# Patient Record
Sex: MALE | Race: OTHER | HISPANIC OR LATINO
[De-identification: names, ages, dates, MRNs, and addresses within clinical notes are randomized per-mention and may not be internally consistent; named-entity substitution may affect disease eponyms.]

---

## 2020-04-09 ENCOUNTER — APPOINTMENT (OUTPATIENT)
Dept: DISASTER EMERGENCY | Facility: CLINIC | Age: 55
End: 2020-04-09
Payer: COMMERCIAL

## 2020-04-09 VITALS
RESPIRATION RATE: 14 BRPM | OXYGEN SATURATION: 99 % | TEMPERATURE: 98 F | HEART RATE: 87 BPM | SYSTOLIC BLOOD PRESSURE: 126 MMHG | DIASTOLIC BLOOD PRESSURE: 84 MMHG

## 2020-04-09 DIAGNOSIS — R68.89 OTHER GENERAL SYMPTOMS AND SIGNS: ICD-10-CM

## 2020-04-09 DIAGNOSIS — Z86.79 PERSONAL HISTORY OF OTHER DISEASES OF THE CIRCULATORY SYSTEM: ICD-10-CM

## 2020-04-09 DIAGNOSIS — R05 COUGH: ICD-10-CM

## 2020-04-09 DIAGNOSIS — R50.9 FEVER, UNSPECIFIED: ICD-10-CM

## 2020-04-09 DIAGNOSIS — Z86.69 PERSONAL HISTORY OF OTHER DISEASES OF THE NERVOUS SYSTEM AND SENSE ORGANS: ICD-10-CM

## 2020-04-09 PROBLEM — Z00.00 ENCOUNTER FOR PREVENTIVE HEALTH EXAMINATION: Status: ACTIVE | Noted: 2020-04-09

## 2020-04-09 PROCEDURE — 99213 OFFICE O/P EST LOW 20 MIN: CPT

## 2020-04-09 NOTE — ADDENDUM
[FreeTextEntry1] : -Likely Dx. of COVID-19, will test given patient is immunocompromised\par -Supportive care advised: Encouraged to increase fluids, rest, and take Tylenol PRN  as per 's recommendations\par -Discussed quarantine until 72 hours symptom free including fever free without use of Tylenol\par -Literature on COVID-19 and measures to prevent exposure to others provided and reviewed with pt\par -Pt to be notified by [facility] with results\par -\par \par

## 2020-04-09 NOTE — HISTORY OF PRESENT ILLNESS
[Patient presents to the office today for COVID-19 evaluation and testing.] : Patient presents to the office today for COVID-19 evaluation and testing. [Patient has been pre-screened by RN at call center for appointment today with our facility.] : Patient has been pre-screened by RN at call center for appointment today with our facility. [] : no dizziness on standing [With Confirmed Case] : patient exposed to a confirmed case of COVID-19 [Heart Disease] : heart disease [None] : none [COVID-19 testing ordered and specimen obtained] : COVID-19 testing ordered and specimen obtained [Discharged with current Quarantine instructions and advised of signs of worsening illness.] : Patient discharged with current quarantine instructions and advised of signs of worsening illness. Patient told to seek emergent care if symptoms occur. [FreeTextEntry1] : Mr. Moreno presents today with dry cough,. sore throat, subjective fever for the past x 4 weeks.\par Denies CP, SOB, AMBROSE, palpitations, lightheadedness, fever, chills, nausea, vomiting, diarrhea, or recent travel.\par \par  [TextBox_66] : MS [TextBox_107] : -Likely Dx. of COVID-19, will test given patient is immunocompromised.\par -Supportive care advised: Encouraged to increase fluids, rest, and take Tylenol PRN  as per 's recommendations\par -Discussed quarantine until 72 hours symptom free including fever free without use of Tylenol\par -Literature on COVID-19 and measures to prevent exposure to others provided and reviewed with pt\par -Pt to be notified by [facility] with results\par \par \par

## 2020-04-11 LAB — SARS-COV-2 N GENE NPH QL NAA+PROBE: NOT DETECTED

## 2020-05-12 PROBLEM — R68.89 SUSPECTED COVID-19 VIRUS INFECTION: Status: ACTIVE | Noted: 2020-04-09

## 2020-05-12 PROBLEM — R05 COUGH IN ADULT: Status: ACTIVE | Noted: 2020-05-12

## 2020-05-12 PROBLEM — R50.9 FEVER, UNSPECIFIED: Status: ACTIVE | Noted: 2020-05-12

## 2023-03-30 ENCOUNTER — TRANSCRIPTION ENCOUNTER (OUTPATIENT)
Age: 58
End: 2023-03-30

## 2023-03-30 VITALS
RESPIRATION RATE: 16 BRPM | DIASTOLIC BLOOD PRESSURE: 70 MMHG | TEMPERATURE: 97 F | WEIGHT: 175.71 LBS | HEIGHT: 70 IN | SYSTOLIC BLOOD PRESSURE: 112 MMHG | OXYGEN SATURATION: 96 % | HEART RATE: 88 BPM

## 2023-03-30 NOTE — ASU PATIENT PROFILE, ADULT - NSICDXPASTSURGICALHX_GEN_ALL_CORE_FT
PAST SURGICAL HISTORY:  H/O spinal fusion C5-6-7    History of shoulder surgery right shoulder X 2    S/P foot surgery L    Status post medial meniscus repair R

## 2023-03-30 NOTE — ASU PATIENT PROFILE, ADULT - FALL HARM RISK - UNIVERSAL INTERVENTIONS
Bed in lowest position, wheels locked, appropriate side rails in place/Call bell, personal items and telephone in reach/Instruct patient to call for assistance before getting out of bed or chair/Non-slip footwear when patient is out of bed/[place] to call system/Physically safe environment - no spills, clutter or unnecessary equipment/Purposeful Proactive Rounding/Room/bathroom lighting operational, light cord in reach

## 2023-03-31 ENCOUNTER — INPATIENT (INPATIENT)
Facility: HOSPITAL | Age: 58
LOS: 0 days | Discharge: HOME | DRG: 473 | End: 2023-04-01
Attending: NEUROLOGICAL SURGERY | Admitting: NEUROLOGICAL SURGERY
Payer: COMMERCIAL

## 2023-03-31 ENCOUNTER — TRANSCRIPTION ENCOUNTER (OUTPATIENT)
Age: 58
End: 2023-03-31

## 2023-03-31 DIAGNOSIS — Z98.1 ARTHRODESIS STATUS: Chronic | ICD-10-CM

## 2023-03-31 DIAGNOSIS — Z98.890 OTHER SPECIFIED POSTPROCEDURAL STATES: Chronic | ICD-10-CM

## 2023-03-31 DIAGNOSIS — M50.20 OTHER CERVICAL DISC DISPLACEMENT, UNSPECIFIED CERVICAL REGION: ICD-10-CM

## 2023-03-31 DIAGNOSIS — Z98.89 OTHER SPECIFIED POSTPROCEDURAL STATES: Chronic | ICD-10-CM

## 2023-03-31 LAB
BLD GP AB SCN SERPL QL: NEGATIVE — SIGNIFICANT CHANGE UP
RH IG SCN BLD-IMP: POSITIVE — SIGNIFICANT CHANGE UP

## 2023-03-31 DEVICE — SURGIFLO HEMOSTATIC MATRIX KIT: Type: IMPLANTABLE DEVICE | Status: FUNCTIONAL

## 2023-03-31 DEVICE — IMPLANTABLE DEVICE: Type: IMPLANTABLE DEVICE | Status: FUNCTIONAL

## 2023-03-31 DEVICE — SURGIFOAM PAD 8CM X 12.5CM X 10MM (100): Type: IMPLANTABLE DEVICE | Status: FUNCTIONAL

## 2023-03-31 RX ORDER — OXYCODONE HYDROCHLORIDE 5 MG/1
5 TABLET ORAL EVERY 4 HOURS
Refills: 0 | Status: DISCONTINUED | OUTPATIENT
Start: 2023-03-31 | End: 2023-04-01

## 2023-03-31 RX ORDER — APREPITANT 80 MG/1
40 CAPSULE ORAL ONCE
Refills: 0 | Status: COMPLETED | OUTPATIENT
Start: 2023-03-31 | End: 2023-03-31

## 2023-03-31 RX ORDER — POVIDONE-IODINE 5 %
1 AEROSOL (ML) TOPICAL ONCE
Refills: 0 | Status: COMPLETED | OUTPATIENT
Start: 2023-03-31 | End: 2023-03-31

## 2023-03-31 RX ORDER — SODIUM CHLORIDE 9 MG/ML
1000 INJECTION, SOLUTION INTRAVENOUS
Refills: 0 | Status: DISCONTINUED | OUTPATIENT
Start: 2023-03-31 | End: 2023-03-31

## 2023-03-31 RX ORDER — SENNA PLUS 8.6 MG/1
2 TABLET ORAL AT BEDTIME
Refills: 0 | Status: DISCONTINUED | OUTPATIENT
Start: 2023-03-31 | End: 2023-04-01

## 2023-03-31 RX ORDER — OXYCODONE HYDROCHLORIDE 5 MG/1
10 TABLET ORAL EVERY 6 HOURS
Refills: 0 | Status: DISCONTINUED | OUTPATIENT
Start: 2023-03-31 | End: 2023-04-01

## 2023-03-31 RX ORDER — ACETAMINOPHEN 500 MG
1000 TABLET ORAL ONCE
Refills: 0 | Status: COMPLETED | OUTPATIENT
Start: 2023-03-31 | End: 2023-03-31

## 2023-03-31 RX ORDER — CYCLOBENZAPRINE HYDROCHLORIDE 10 MG/1
10 TABLET, FILM COATED ORAL EVERY 8 HOURS
Refills: 0 | Status: DISCONTINUED | OUTPATIENT
Start: 2023-03-31 | End: 2023-04-01

## 2023-03-31 RX ORDER — DEXAMETHASONE 0.5 MG/5ML
4 ELIXIR ORAL EVERY 6 HOURS
Refills: 0 | Status: COMPLETED | OUTPATIENT
Start: 2023-03-31 | End: 2023-04-01

## 2023-03-31 RX ORDER — CELECOXIB 200 MG/1
200 CAPSULE ORAL ONCE
Refills: 0 | Status: COMPLETED | OUTPATIENT
Start: 2023-03-31 | End: 2023-03-31

## 2023-03-31 RX ORDER — HYDROMORPHONE HYDROCHLORIDE 2 MG/ML
0.5 INJECTION INTRAMUSCULAR; INTRAVENOUS; SUBCUTANEOUS
Refills: 0 | Status: DISCONTINUED | OUTPATIENT
Start: 2023-03-31 | End: 2023-03-31

## 2023-03-31 RX ORDER — ACETAMINOPHEN 500 MG
1000 TABLET ORAL EVERY 8 HOURS
Refills: 0 | Status: DISCONTINUED | OUTPATIENT
Start: 2023-03-31 | End: 2023-04-01

## 2023-03-31 RX ORDER — CHLORHEXIDINE GLUCONATE 213 G/1000ML
1 SOLUTION TOPICAL
Refills: 0 | Status: DISCONTINUED | OUTPATIENT
Start: 2023-03-31 | End: 2023-03-31

## 2023-03-31 RX ORDER — ACETAMINOPHEN 500 MG
1000 TABLET ORAL EVERY 8 HOURS
Refills: 0 | Status: DISCONTINUED | OUTPATIENT
Start: 2023-03-31 | End: 2023-03-31

## 2023-03-31 RX ORDER — ONDANSETRON 8 MG/1
4 TABLET, FILM COATED ORAL EVERY 6 HOURS
Refills: 0 | Status: DISCONTINUED | OUTPATIENT
Start: 2023-03-31 | End: 2023-04-01

## 2023-03-31 RX ADMIN — ONDANSETRON 4 MILLIGRAM(S): 8 TABLET, FILM COATED ORAL at 14:30

## 2023-03-31 RX ADMIN — CHLORHEXIDINE GLUCONATE 1 APPLICATION(S): 213 SOLUTION TOPICAL at 09:01

## 2023-03-31 RX ADMIN — Medication 1 APPLICATION(S): at 09:01

## 2023-03-31 RX ADMIN — HYDROMORPHONE HYDROCHLORIDE 0.5 MILLIGRAM(S): 2 INJECTION INTRAMUSCULAR; INTRAVENOUS; SUBCUTANEOUS at 15:35

## 2023-03-31 RX ADMIN — APREPITANT 40 MILLIGRAM(S): 80 CAPSULE ORAL at 08:54

## 2023-03-31 RX ADMIN — OXYCODONE HYDROCHLORIDE 5 MILLIGRAM(S): 5 TABLET ORAL at 16:19

## 2023-03-31 RX ADMIN — HYDROMORPHONE HYDROCHLORIDE 0.5 MILLIGRAM(S): 2 INJECTION INTRAMUSCULAR; INTRAVENOUS; SUBCUTANEOUS at 15:09

## 2023-03-31 RX ADMIN — OXYCODONE HYDROCHLORIDE 10 MILLIGRAM(S): 5 TABLET ORAL at 23:16

## 2023-03-31 RX ADMIN — CELECOXIB 200 MILLIGRAM(S): 200 CAPSULE ORAL at 08:54

## 2023-03-31 RX ADMIN — Medication 400 MILLIGRAM(S): at 14:49

## 2023-03-31 RX ADMIN — Medication 4 MILLIGRAM(S): at 17:34

## 2023-03-31 RX ADMIN — OXYCODONE HYDROCHLORIDE 5 MILLIGRAM(S): 5 TABLET ORAL at 16:49

## 2023-03-31 RX ADMIN — Medication 1000 MILLIGRAM(S): at 15:12

## 2023-03-31 RX ADMIN — Medication 1000 MILLIGRAM(S): at 08:53

## 2023-03-31 RX ADMIN — SODIUM CHLORIDE 75 MILLILITER(S): 9 INJECTION, SOLUTION INTRAVENOUS at 14:49
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
## 2023-03-31 NOTE — H&P ADULT - NSICDXFAMILYHX_GEN_ALL_CORE_FT
FAMILY HISTORY:  Father  Still living? Yes, Estimated age: Age Unknown  Family history of diabetes mellitus, Age at diagnosis: Age Unknown  Family history of hypertension, Age at diagnosis: Age Unknown    Mother  Still living? Yes, Estimated age: Age Unknown  Family history of diabetes mellitus, Age at diagnosis: Age Unknown

## 2023-03-31 NOTE — H&P ADULT - PROBLEM SELECTOR PLAN 1
OR today for ACDF C4-5, possible other levels  Risks, benefits, alternatives explained to the patient who understands and agrees to proceed with above  D/w Dr. Graves who agrees with above

## 2023-03-31 NOTE — PATIENT PROFILE ADULT - FALL HARM RISK - HARM RISK INTERVENTIONS
Assistance with ambulation/Assistance OOB with selected safe patient handling equipment/Communicate Risk of Fall with Harm to all staff/Monitor gait and stability/Reinforce activity limits and safety measures with patient and family/Sit up slowly, dangle for a short time, stand at bedside before walking/Tailored Fall Risk Interventions/Use of alarms - bed, chair and/or voice tab/Visual Cue: Yellow wristband and red socks/Bed in lowest position, wheels locked, appropriate side rails in place/Call bell, personal items and telephone in reach/Instruct patient to call for assistance before getting out of bed or chair/Non-slip footwear when patient is out of bed/[place] to call system/Physically safe environment - no spills, clutter or unnecessary equipment/Purposeful Proactive Rounding/Room/bathroom lighting operational, light cord in reach Assistance with ambulation/Assistance OOB with selected safe patient handling equipment/Communicate Risk of Fall with Harm to all staff/Monitor gait and stability/Orthostatic vital signs/Reinforce activity limits and safety measures with patient and family/Sit up slowly, dangle for a short time, stand at bedside before walking/Tailored Fall Risk Interventions/Use of alarms - bed, chair and/or voice tab/Visual Cue: Yellow wristband and red socks/Bed in lowest position, wheels locked, appropriate side rails in place/Call bell, personal items and telephone in reach/Instruct patient to call for assistance before getting out of bed or chair/Non-slip footwear when patient is out of bed/[place] to call system/Physically safe environment - no spills, clutter or unnecessary equipment/Purposeful Proactive Rounding/Room/bathroom lighting operational, light cord in reach

## 2023-03-31 NOTE — PATIENT PROFILE ADULT - NSPROGENOTHERPROVIDER_GEN_A_NUR
none Implemented All Fall with Harm Risk Interventions:  [place] to call system. Call bell, personal items and telephone within reach. Instruct patient to call for assistance. Room bathroom lighting operational. Non-slip footwear when patient is off stretcher. Physically safe environment: no spills, clutter or unnecessary equipment. Stretcher in lowest position, wheels locked, appropriate side rails in place. Provide visual cue, wrist band, yellow gown, etc. Monitor gait and stability. Monitor for mental status changes and reorient to person, place, and time. Review medications for side effects contributing to fall risk. Reinforce activity limits and safety measures with patient and family. Provide visual clues: red socks.

## 2023-03-31 NOTE — H&P ADULT - NSHPPHYSICALEXAM_GEN_ALL_CORE
NAD, AAOx 3  MMM, no lymphadenopathy  +S1, S2  normal breathing pattern  abd soft NT  no edema  calves soft NT b/l    All CN intact  anterior neck scar well healed  normal sensation MN/UN/RN b/l  normal strength BUE  right paraspinal spasm and tenderness, no midline tenderness

## 2023-03-31 NOTE — H&P ADULT - NSHPLABSRESULTS_GEN_ALL_CORE
Please see completed report in cardiology procedures.  
MRI of cervical spine from outside facility shows C4-5 disc herniation

## 2023-04-01 ENCOUNTER — TRANSCRIPTION ENCOUNTER (OUTPATIENT)
Age: 58
End: 2023-04-01

## 2023-04-01 VITALS
SYSTOLIC BLOOD PRESSURE: 126 MMHG | RESPIRATION RATE: 16 BRPM | HEART RATE: 76 BPM | DIASTOLIC BLOOD PRESSURE: 62 MMHG | OXYGEN SATURATION: 96 %

## 2023-04-01 DIAGNOSIS — D72.829 ELEVATED WHITE BLOOD CELL COUNT, UNSPECIFIED: ICD-10-CM

## 2023-04-01 DIAGNOSIS — Z98.890 OTHER SPECIFIED POSTPROCEDURAL STATES: ICD-10-CM

## 2023-04-01 LAB
ANION GAP SERPL CALC-SCNC: 10 MMOL/L — SIGNIFICANT CHANGE UP (ref 5–17)
BASOPHILS # BLD AUTO: 0.03 K/UL — SIGNIFICANT CHANGE UP (ref 0–0.2)
BASOPHILS NFR BLD AUTO: 0.2 % — SIGNIFICANT CHANGE UP (ref 0–2)
BUN SERPL-MCNC: 18 MG/DL — SIGNIFICANT CHANGE UP (ref 7–23)
CALCIUM SERPL-MCNC: 8.7 MG/DL — SIGNIFICANT CHANGE UP (ref 8.4–10.5)
CHLORIDE SERPL-SCNC: 101 MMOL/L — SIGNIFICANT CHANGE UP (ref 96–108)
CO2 SERPL-SCNC: 24 MMOL/L — SIGNIFICANT CHANGE UP (ref 22–31)
CREAT SERPL-MCNC: 0.74 MG/DL — SIGNIFICANT CHANGE UP (ref 0.5–1.3)
EGFR: 106 ML/MIN/1.73M2 — SIGNIFICANT CHANGE UP
EOSINOPHIL # BLD AUTO: 0 K/UL — SIGNIFICANT CHANGE UP (ref 0–0.5)
EOSINOPHIL NFR BLD AUTO: 0 % — SIGNIFICANT CHANGE UP (ref 0–6)
GLUCOSE BLDC GLUCOMTR-MCNC: 139 MG/DL — HIGH (ref 70–99)
GLUCOSE BLDC GLUCOMTR-MCNC: 154 MG/DL — HIGH (ref 70–99)
GLUCOSE SERPL-MCNC: 145 MG/DL — HIGH (ref 70–99)
HCT VFR BLD CALC: 45.4 % — SIGNIFICANT CHANGE UP (ref 39–50)
HGB BLD-MCNC: 14.7 G/DL — SIGNIFICANT CHANGE UP (ref 13–17)
IMM GRANULOCYTES NFR BLD AUTO: 0.7 % — SIGNIFICANT CHANGE UP (ref 0–0.9)
LYMPHOCYTES # BLD AUTO: 10.9 % — LOW (ref 13–44)
LYMPHOCYTES # BLD AUTO: 2.04 K/UL — SIGNIFICANT CHANGE UP (ref 1–3.3)
MAGNESIUM SERPL-MCNC: 2.2 MG/DL — SIGNIFICANT CHANGE UP (ref 1.6–2.6)
MCHC RBC-ENTMCNC: 30.7 PG — SIGNIFICANT CHANGE UP (ref 27–34)
MCHC RBC-ENTMCNC: 32.4 GM/DL — SIGNIFICANT CHANGE UP (ref 32–36)
MCV RBC AUTO: 94.8 FL — SIGNIFICANT CHANGE UP (ref 80–100)
MONOCYTES # BLD AUTO: 0.66 K/UL — SIGNIFICANT CHANGE UP (ref 0–0.9)
MONOCYTES NFR BLD AUTO: 3.5 % — SIGNIFICANT CHANGE UP (ref 2–14)
NEUTROPHILS # BLD AUTO: 15.84 K/UL — HIGH (ref 1.8–7.4)
NEUTROPHILS NFR BLD AUTO: 84.7 % — HIGH (ref 43–77)
NRBC # BLD: 0 /100 WBCS — SIGNIFICANT CHANGE UP (ref 0–0)
PHOSPHATE SERPL-MCNC: 2.2 MG/DL — LOW (ref 2.5–4.5)
PLATELET # BLD AUTO: 268 K/UL — SIGNIFICANT CHANGE UP (ref 150–400)
POTASSIUM SERPL-MCNC: 3.9 MMOL/L — SIGNIFICANT CHANGE UP (ref 3.5–5.3)
POTASSIUM SERPL-SCNC: 3.9 MMOL/L — SIGNIFICANT CHANGE UP (ref 3.5–5.3)
RBC # BLD: 4.79 M/UL — SIGNIFICANT CHANGE UP (ref 4.2–5.8)
RBC # FLD: 13.2 % — SIGNIFICANT CHANGE UP (ref 10.3–14.5)
SODIUM SERPL-SCNC: 135 MMOL/L — SIGNIFICANT CHANGE UP (ref 135–145)
WBC # BLD: 18.71 K/UL — HIGH (ref 3.8–10.5)
WBC # FLD AUTO: 18.71 K/UL — HIGH (ref 3.8–10.5)

## 2023-04-01 PROCEDURE — 99024 POSTOP FOLLOW-UP VISIT: CPT

## 2023-04-01 PROCEDURE — 99232 SBSQ HOSP IP/OBS MODERATE 35: CPT

## 2023-04-01 RX ORDER — ENOXAPARIN SODIUM 100 MG/ML
40 INJECTION SUBCUTANEOUS EVERY 24 HOURS
Refills: 0 | Status: DISCONTINUED | OUTPATIENT
Start: 2023-04-01 | End: 2023-04-01

## 2023-04-01 RX ORDER — OXYCODONE HYDROCHLORIDE 5 MG/1
1 TABLET ORAL
Qty: 18 | Refills: 0
Start: 2023-04-01 | End: 2023-04-03

## 2023-04-01 RX ORDER — SENNA PLUS 8.6 MG/1
2 TABLET ORAL
Qty: 7 | Refills: 0
Start: 2023-04-01

## 2023-04-01 RX ORDER — PANTOPRAZOLE SODIUM 20 MG/1
1 TABLET, DELAYED RELEASE ORAL
Qty: 7 | Refills: 0
Start: 2023-04-01 | End: 2023-04-07

## 2023-04-01 RX ORDER — CYCLOBENZAPRINE HYDROCHLORIDE 10 MG/1
1 TABLET, FILM COATED ORAL
Qty: 15 | Refills: 0
Start: 2023-04-01 | End: 2023-04-05

## 2023-04-01 RX ADMIN — Medication 50 MILLIGRAM(S): at 06:26

## 2023-04-01 RX ADMIN — Medication 4 MILLIGRAM(S): at 13:15

## 2023-04-01 RX ADMIN — Medication 1 TABLET(S): at 13:15

## 2023-04-01 RX ADMIN — OXYCODONE HYDROCHLORIDE 10 MILLIGRAM(S): 5 TABLET ORAL at 00:21

## 2023-04-01 RX ADMIN — SENNA PLUS 2 TABLET(S): 8.6 TABLET ORAL at 00:14

## 2023-04-01 RX ADMIN — Medication 50 MILLIGRAM(S): at 13:15

## 2023-04-01 RX ADMIN — Medication 4 MILLIGRAM(S): at 00:31

## 2023-04-01 RX ADMIN — Medication 1000 MILLIGRAM(S): at 00:21

## 2023-04-01 RX ADMIN — ONDANSETRON 4 MILLIGRAM(S): 8 TABLET, FILM COATED ORAL at 00:17

## 2023-04-01 RX ADMIN — Medication 4 MILLIGRAM(S): at 06:25

## 2023-04-01 RX ADMIN — OXYCODONE HYDROCHLORIDE 10 MILLIGRAM(S): 5 TABLET ORAL at 07:31

## 2023-04-01 RX ADMIN — Medication 1000 MILLIGRAM(S): at 07:31

## 2023-04-01 RX ADMIN — Medication 1000 MILLIGRAM(S): at 06:26

## 2023-04-01 RX ADMIN — Medication 1000 MILLIGRAM(S): at 00:19

## 2023-04-01 RX ADMIN — OXYCODONE HYDROCHLORIDE 10 MILLIGRAM(S): 5 TABLET ORAL at 06:26

## 2023-04-01 NOTE — DISCHARGE NOTE PROVIDER - HOSPITAL COURSE
HPI: Patient is a 59 y/o male who presents for elective anterior discectomy and fusion at C4-5. Now S/P C4-5 ACDF (3/31).     Hospital Course:  3/31: POD0 C4-5 ACDF. Dressing replaced.  4/1: POD1. PILAR overnight. Neuro stable.     Patient evaluated by PT/OT who recommended:  Patient is going home     Hospital course c/b: uncomplicated     Exam on day of discharge:  General: patient seen laying supine in bed in NAD  Neuro: AAOx3, FC, OE spontaneously, speech clear and fluent, CNII-XI grossly intact, face symmetric, no pronator drift, finger to nose intact, strength 5/5 b/l UE and LE, sensation intact to light touch throughout  HEENT: PERRL, EOMI  Neck: supple  Cardiac: RRR, S1S2  Pulmonary: chest rise symmetric  Abdomen: soft, nontender, nondistended  Ext: perfusing well  Skin: warm, dry  Wound: anterior cervical incision, clean/dry/intact    The patient is neurologically stable. Labs/vitals stable. Pain adequately controlled.
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
## 2023-04-01 NOTE — DISCHARGE NOTE NURSING/CASE MANAGEMENT/SOCIAL WORK - NSDCPEFALRISK_GEN_ALL_CORE
For information on Fall & Injury Prevention, visit: [email or web address] OR  [email or web address] OR  https://www.cdc.gov/steadi/patient.html

## 2023-04-01 NOTE — PROCEDURE NOTE - ADDITIONAL PROCEDURE DETAILS
JESSICA bulb was deflated. Area was cleansed with chlorhexidine.  Suture was cut. catheter removed. Puncture hole was closed with steri strip.

## 2023-04-01 NOTE — OCCUPATIONAL THERAPY INITIAL EVALUATION ADULT - DIAGNOSIS, OT EVAL
Patient POD #1 s/p C4-C5 ACDF, with cervical/spinal precautions, does not present with physical or neurological deficits impacting independence with functional activities and mobility. Patient demonstrates safe and independent performance of ADL's and functional mobility with no AD and is d/c from skilled OT at this time.

## 2023-04-01 NOTE — DISCHARGE NOTE PROVIDER - NSDCMRMEDTOKEN_GEN_ALL_CORE_FT
Stiolto Respimat 10 ACT 2.5 mcg-2.5 mcg/inh inhalation aerosol: 2 inhaled once a day   cyclobenzaprine 10 mg oral tablet: 1 tab(s) orally every 8 hours as needed for Muscle Spasm MDD: 3  Innerclean oral tablet: 2 tab(s) orally once a day (at bedtime) as needed for  constipation  Lyrica 50 mg oral capsule: 1 cap(s) orally 3 times a day MDD: 3  Medrol Dosepak 4 mg oral tablet: 1 packet(s) orally once a day Follow steroid taper on the pack  Protonix 40 mg oral delayed release tablet: 1 tab(s) orally once a day To be taken while on the medrol dose pack  Roxicodone 5 mg oral tablet: 1 tab(s) orally every 4 hours as needed for  severe pain MDD: 6  Stiolto Respimat 10 ACT 2.5 mcg-2.5 mcg/inh inhalation aerosol: 2 inhaled once a day

## 2023-04-01 NOTE — DISCHARGE NOTE PROVIDER - NSDCFUADDINST_GEN_ALL_CORE_FT
Neurosurgery follow up appointment date/time:  - you have internal stiches in place that do not need to be removed  - please call the office to confirm appointment: [phone]     Wound Care:  - you can shower post operative day 5 (4/5/23)  - keep incision open to air     Devices:  - you were provided a soft collar for comfort, you can wear the collar as you feel needed    Activity:  - fatigue is common after surgery, rest if you feel tired   - no bending, lifting, twisting or heavy lifting   - walking is recommended, ambulate as tolerated  - keep incision clean/dry  - no baths/swimming pools/hot tubs    - no driving within 24 hours of anesthesia or while taking prescription pain medications   - keep hydrated, drink plenty of water     Please also follow up with your primary care doctor.     Pain Expectations:  - pain after surgery is expected  - please take pain medications as prescribed     Medications:  - you may continue taking your home medications as prescribed   - you were prescribed a medrol dose pack (steroid taper), protonix to protect the stomach while taking the steroid taper, flexeril as needed for muscle spasms, oxycodone as needed for severe pain, lyrica for nerve related symptoms, bowel regimen as needed for constipation  - pain medications can cause constipation, you should eat a high fiber diet and may take a stool softener while on pain meds   - Avoid taking Advil (ibuprofen), Motrin (naproxen), or Aspirin for pain as they can cause bleeding     Call the office or come to ED if:  - wound has drainage or bleeding, increased redness or pain at incision site, neurological change, fever (>101), chills, night sweats, syncope, nausea/vomiting      Playback:  - discharge instructions uploaded to [employer]    WITHIN 24 HOURS OF DISCHARGE, PLEASE CONTACT NEURO PA  WITH ANY QUESTIONS OR CONCERNS: [phone]   OTHERWISE, PLEASE CALL THE OFFICE WITH ANY QUESTIONS OR CONCERNS: [phone]

## 2023-04-01 NOTE — DISCHARGE NOTE PROVIDER - NSDCFUADDAPPT_GEN_ALL_CORE_FT
Please follow-up with Dr. Graves, call his office to confirm appointment pro/time    Follow up with your primary care doctor

## 2023-04-01 NOTE — OCCUPATIONAL THERAPY INITIAL EVALUATION ADULT - GENERAL OBSERVATIONS, REHAB EVAL
Patient fiance present. Patient received seated in bed +tele, +anterior cervical bandage C/D/I, room air, NAD.
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
## 2023-04-01 NOTE — PROGRESS NOTE ADULT - ASSESSMENT
57 y/o male who presents for elective anterior discectomy and fusion at C4-5. Now S/P C4-5 ACDF (3/31).

## 2023-04-01 NOTE — OCCUPATIONAL THERAPY INITIAL EVALUATION ADULT - ADDITIONAL COMMENTS
Patient reports living with his fiance in an elevator access apartment building however will be staying with his sister in a private home with 3 GEORGE and 1 FOS to the 2nd floor where the bedroom is. Patient was independent with all ADL's, IADL's and functional mobility with no AD prior to admission. Patient is R hand dominant and will have a walk in shower at [facility] .

## 2023-04-01 NOTE — PROGRESS NOTE ADULT - REASON FOR ADMISSION
right sided neck pain radiating to right arm

## 2023-04-01 NOTE — OCCUPATIONAL THERAPY INITIAL EVALUATION ADULT - MODIFIED CLINICAL TEST OF SENSORY INTEGRATION IN BALANCE TEST
Patient functionally ambulated in the hallway and performed 1 FOS up and down independently with no AD.

## 2023-04-01 NOTE — DISCHARGE NOTE PROVIDER - NSDCCPTREATMENT_GEN_ALL_CORE_FT
PRINCIPAL PROCEDURE  Procedure: Anterior cervical discectomy with fusion  Findings and Treatment: C4-5

## 2023-04-01 NOTE — DISCHARGE NOTE NURSING/CASE MANAGEMENT/SOCIAL WORK - PATIENT PORTAL LINK FT
You can access the FollowMyHealth Patient Portal offered by [facility] by registering at the following website: [email or web address]. By joining [facility]’s FollowMyHealth portal, you will also be able to view your health information using other applications (apps) compatible with our system.

## 2023-04-01 NOTE — PROCEDURE NOTE - ESTIMATED BLOOD LOSS
Shift assessment completed. Routine vitals stable. Scheduled medications given. Patient is awake and alert  Respirations are easy and unlabored. No c/o pain. Pt incont x2 this shift. Pt requested purewick cath and depends. Purewick in place. Call light within reach. Will cont to monitor. None

## 2023-04-01 NOTE — OCCUPATIONAL THERAPY INITIAL EVALUATION ADULT - REHAB POTENTIAL, OT EVAL
Patient demonstrates safe and independent performance of ADL's and functional mobility with no AD and is d/c from skilled OT at this time.

## 2023-04-01 NOTE — DISCHARGE NOTE PROVIDER - CARE PROVIDER_API CALL
Keenan Graves)  Neurosurgery  [address], [address]  [place] [address]  Phone: [phone]  Fax: [phone]  Follow Up Time:

## 2023-04-08 DIAGNOSIS — M50.121 CERVICAL DISC DISORDER AT C4-C5 LEVEL WITH RADICULOPATHY: ICD-10-CM

## 2023-04-08 DIAGNOSIS — M50.221 OTHER CERVICAL DISC DISPLACEMENT AT C4-C5 LEVEL: ICD-10-CM

## 2023-04-08 DIAGNOSIS — D72.829 ELEVATED WHITE BLOOD CELL COUNT, UNSPECIFIED: ICD-10-CM

## 2023-04-12 NOTE — PRE-ANESTHESIA EVALUATION ADULT - NSANTHOBSERVEDRD_ENT_A_CORE
On assessment, patient complaining of abdominal pain. Patient NGT not suctioning and canister empty from when I assumed care. Writing RN ensured the NGT is working and functioning properly. NG to LIS, immediately suction 100 cc brown drainage. Continue NGT to LIS and then clamped NGT at [address]: Patient denies pain, reports some nausea but tolerated. Patient reports difficulty breathing because of abdominal distention. Patient reposition in bed, raise HOB, on 2l o2 for comfort, encourage IS use.       Problem: Diabetes/Glucose Control  Goal: Glucose maintained within prescribed range  Description: INTERVENTIONS:  - Monitor Blood Glucose as ordered  - Assess for signs and symptoms of hyperglycemia and hypoglycemia  - Administer ordered medications to maintain glucose within target range  - Assess barriers to adequate nutritional intake and initiate nutrition consult as needed  - Instruct patient on self management of diabetes  Outcome: Progressing     Problem: PAIN - ADULT  Goal: Verbalizes/displays adequate comfort level or patient's stated pain goal  Description: INTERVENTIONS:  - Encourage pt to monitor pain and request assistance  - Assess pain using appropriate pain scale  - Administer analgesics based on type and severity of pain and evaluate response  - Implement non-pharmacological measures as appropriate and evaluate response  - Consider cultural and social influences on pain and pain management  - Manage/alleviate anxiety  - Utilize distraction and/or relaxation techniques  - Monitor for opioid side effects  - Notify MD/LIP if interventions unsuccessful or patient reports new pain  - Anticipate increased pain with activity and pre-medicate as appropriate  Outcome: Progressing     Problem: GASTROINTESTINAL - ADULT  Goal: Minimal or absence of nausea and vomiting  Description: INTERVENTIONS:  - Maintain adequate hydration with IV or PO as ordered and tolerated  - Nasogastric tube to low intermittent suction as ordered  - Evaluate effectiveness of ordered antiemetic medications  - Provide nonpharmacologic comfort measures as appropriate  - Advance diet as tolerated, if ordered  - Obtain nutritional consult as needed  - Evaluate fluid balance  Outcome: Progressing  Goal: Maintains or returns to baseline bowel function  Description: INTERVENTIONS:  - Assess bowel function  - Maintain adequate hydration with IV or PO as ordered and tolerated  - Evaluate effectiveness of GI medications  - Encourage mobilization and activity  - Obtain nutritional consult as needed  - Establish a toileting routine/schedule  - Consider collaborating with pharmacy to review patient's medication profile  Outcome: Progressing     Problem: HEMATOLOGIC - ADULT  Goal: Free from bleeding injury  Description: (Example usage: patient with low platelets)  INTERVENTIONS:  - Avoid intramuscular injections, enemas and rectal medication administration  - Ensure safe mobilization of patient  - Hold pressure on venipuncture sites to achieve adequate hemostasis  - Assess for signs and symptoms of internal bleeding  - Monitor lab trends  - Patient is to report abnormal signs of bleeding to staff  - Avoid use of toothpicks and dental floss  - Use electric shaver for shaving  - Use soft bristle tooth brush  - Limit straining and forceful nose blowing  Outcome: Progressing     Problem: Patient/Family Goals  Goal: Patient/Family Long Term Goal  Description: Patient's Long Term Goal: Discharge    Interventions:  - TPN  -Ambulate 4X/day  - See additional Care Plan goals for specific interventions  Outcome: Progressing  Goal: Patient/Family Short Term Goal  Description: Patient's Short Term Goal: Comfort    Interventions:   - Pain meds as needed  - See additional Care Plan goals for specific interventions  Outcome: Progressing     Problem: SAFETY ADULT - FALL  Goal: Free from fall injury  Description: INTERVENTIONS:  - Assess pt frequently for physical needs  - Identify cognitive and physical deficits and behaviors that affect risk of falls.   - [place] fall precautions as indicated by assessment.  - Educate pt/family on patient safety including physical limitations  - Instruct pt to call for assistance with activity based on assessment  - Modify environment to reduce risk of injury  - Provide assistive devices as appropriate  - Consider OT/PT consult to assist with strengthening/mobility  - Encourage toileting schedule  Outcome: Progressing No

## 2023-10-08 PROCEDURE — 76000 FLUOROSCOPY <1 HR PHYS/QHP: CPT

## 2023-10-08 PROCEDURE — C1889: CPT

## 2023-10-08 PROCEDURE — 86850 RBC ANTIBODY SCREEN: CPT

## 2023-10-08 PROCEDURE — C1713: CPT

## 2023-10-08 PROCEDURE — 86900 BLOOD TYPING SEROLOGIC ABO: CPT

## 2023-10-08 PROCEDURE — 85025 COMPLETE CBC W/AUTO DIFF WBC: CPT

## 2023-10-08 PROCEDURE — 86901 BLOOD TYPING SEROLOGIC RH(D): CPT

## 2023-10-08 PROCEDURE — 36415 COLL VENOUS BLD VENIPUNCTURE: CPT

## 2023-10-08 PROCEDURE — 80048 BASIC METABOLIC PNL TOTAL CA: CPT

## 2023-10-08 PROCEDURE — 97165 OT EVAL LOW COMPLEX 30 MIN: CPT

## 2023-10-08 PROCEDURE — 84100 ASSAY OF PHOSPHORUS: CPT

## 2023-10-08 PROCEDURE — 82962 GLUCOSE BLOOD TEST: CPT

## 2023-10-08 PROCEDURE — 83735 ASSAY OF MAGNESIUM: CPT

## 2024-01-19 PROBLEM — K40.90 UNILATERAL INGUINAL HERNIA, WITHOUT OBSTRUCTION OR GANGRENE, NOT SPECIFIED AS RECURRENT: Chronic | Status: ACTIVE | Noted: 2023-03-30

## 2024-01-24 ENCOUNTER — TRANSCRIPTION ENCOUNTER (OUTPATIENT)
Age: 59
End: 2024-01-24

## 2024-01-24 VITALS
SYSTOLIC BLOOD PRESSURE: 115 MMHG | HEART RATE: 87 BPM | OXYGEN SATURATION: 97 % | WEIGHT: 182.76 LBS | HEIGHT: 70 IN | DIASTOLIC BLOOD PRESSURE: 71 MMHG | RESPIRATION RATE: 16 BRPM | TEMPERATURE: 97 F

## 2024-01-24 NOTE — ASU PATIENT PROFILE, ADULT - NSICDXPASTMEDICALHX_GEN_ALL_CORE_FT
PAST MEDICAL HISTORY:  Inguinal hernia     MS (multiple sclerosis)      PAST MEDICAL HISTORY:  HTN (hypertension)     Inguinal hernia     MS (multiple sclerosis)      PAST MEDICAL HISTORY:  HLD (hyperlipidemia)     HTN (hypertension)     Inguinal hernia     MS (multiple sclerosis)      PAST MEDICAL HISTORY:  HLD (hyperlipidemia)     Inguinal hernia     MS (multiple sclerosis)

## 2024-01-24 NOTE — ASU PATIENT PROFILE, ADULT - NSICDXPASTSURGICALHX_GEN_ALL_CORE_FT
PAST SURGICAL HISTORY:  H/O spinal fusion C5-6-7    History of shoulder surgery right shoulder X 2    S/P foot surgery L    Status post medial meniscus repair R     PAST SURGICAL HISTORY:  H/O spinal fusion C5-6-7 x2 ( 2010, 2023)    History of shoulder surgery right shoulder X 2    S/P foot surgery L    Status post medial meniscus repair R

## 2024-01-24 NOTE — ASU PATIENT PROFILE, ADULT - FALL HARM RISK - HARM RISK INTERVENTIONS
Assistance with ambulation/Assistance OOB with selected safe patient handling equipment/Communicate Risk of Fall with Harm to all staff/Monitor gait and stability/Reinforce activity limits and safety measures with patient and family/Sit up slowly, dangle for a short time, stand at bedside before walking/Tailored Fall Risk Interventions/Use of alarms - bed, chair and/or voice tab/Visual Cue: Yellow wristband and red socks/Bed in lowest position, wheels locked, appropriate side rails in place/Call bell, personal items and telephone in reach/Instruct patient to call for assistance before getting out of bed or chair/Non-slip footwear when patient is out of bed/[place] to call system/Physically safe environment - no spills, clutter or unnecessary equipment/Purposeful Proactive Rounding/Room/bathroom lighting operational, light cord in reach Communicate Risk of Fall with Harm to all staff/Reinforce activity limits and safety measures with patient and family/Tailored Fall Risk Interventions/Visual Cue: Yellow wristband and red socks/Bed in lowest position, wheels locked, appropriate side rails in place/Call bell, personal items and telephone in reach/Instruct patient to call for assistance before getting out of bed or chair/Non-slip footwear when patient is out of bed/[place] to call system/Physically safe environment - no spills, clutter or unnecessary equipment/Purposeful Proactive Rounding/Room/bathroom lighting operational, light cord in reach

## 2024-01-25 ENCOUNTER — TRANSCRIPTION ENCOUNTER (OUTPATIENT)
Age: 59
End: 2024-01-25

## 2024-01-25 ENCOUNTER — OUTPATIENT (OUTPATIENT)
Dept: OUTPATIENT SERVICES | Facility: HOSPITAL | Age: 59
LOS: 1 days | End: 2024-01-25
Payer: COMMERCIAL

## 2024-01-25 VITALS
DIASTOLIC BLOOD PRESSURE: 64 MMHG | HEART RATE: 71 BPM | RESPIRATION RATE: 13 BRPM | TEMPERATURE: 97 F | SYSTOLIC BLOOD PRESSURE: 112 MMHG | OXYGEN SATURATION: 96 %

## 2024-01-25 DIAGNOSIS — Z98.89 OTHER SPECIFIED POSTPROCEDURAL STATES: Chronic | ICD-10-CM

## 2024-01-25 DIAGNOSIS — Z98.890 OTHER SPECIFIED POSTPROCEDURAL STATES: Chronic | ICD-10-CM

## 2024-01-25 DIAGNOSIS — Z98.1 ARTHRODESIS STATUS: Chronic | ICD-10-CM

## 2024-01-25 PROCEDURE — S2900: CPT

## 2024-01-25 PROCEDURE — 88305 TISSUE EXAM BY PATHOLOGIST: CPT | Mod: 26

## 2024-01-25 PROCEDURE — C1781: CPT

## 2024-01-25 PROCEDURE — 49650 LAP ING HERNIA REPAIR INIT: CPT | Mod: 50

## 2024-01-25 PROCEDURE — 88305 TISSUE EXAM BY PATHOLOGIST: CPT

## 2024-01-25 DEVICE — MESH HERNIA INGUINAL PROGRIP LAPAROSCOPIC LEFT: Type: IMPLANTABLE DEVICE | Status: FUNCTIONAL

## 2024-01-25 DEVICE — MESH HERNIA INGUINAL 3DMAX LARGE 4 X 6" LEFT: Type: IMPLANTABLE DEVICE | Status: FUNCTIONAL

## 2024-01-25 DEVICE — MESH HERNIA INGUINAL 3DMAX LARGE 4 X 6" RIGHT: Type: IMPLANTABLE DEVICE | Status: FUNCTIONAL

## 2024-01-25 DEVICE — MESH HERNIA INGUINAL PROGRIP LAPAROSCOPIC RIGHT: Type: IMPLANTABLE DEVICE | Status: FUNCTIONAL

## 2024-01-25 RX ORDER — OXYCODONE HYDROCHLORIDE 5 MG/1
5 TABLET ORAL EVERY 4 HOURS
Refills: 0 | Status: DISCONTINUED | OUTPATIENT
Start: 2024-01-25 | End: 2024-01-25

## 2024-01-25 RX ORDER — ONDANSETRON 8 MG/1
4 TABLET, FILM COATED ORAL EVERY 4 HOURS
Refills: 0 | Status: DISCONTINUED | OUTPATIENT
Start: 2024-01-25 | End: 2024-01-25

## 2024-01-25 RX ORDER — HYDROMORPHONE HYDROCHLORIDE 2 MG/ML
0.5 INJECTION INTRAMUSCULAR; INTRAVENOUS; SUBCUTANEOUS
Refills: 0 | Status: DISCONTINUED | OUTPATIENT
Start: 2024-01-25 | End: 2024-01-25

## 2024-01-25 RX ORDER — DOCUSATE SODIUM 100 MG
1 CAPSULE ORAL
Qty: 7 | Refills: 0
Start: 2024-01-25 | End: 2024-01-31

## 2024-01-25 RX ORDER — OXYCODONE HYDROCHLORIDE 5 MG/1
1 TABLET ORAL
Qty: 12 | Refills: 0
Start: 2024-01-25 | End: 2024-01-27

## 2024-01-25 RX ORDER — ROSUVASTATIN CALCIUM 5 MG/1
1 TABLET ORAL
Refills: 0 | DISCHARGE

## 2024-01-25 RX ORDER — ACETAMINOPHEN 500 MG
1000 TABLET ORAL ONCE
Refills: 0 | Status: COMPLETED | OUTPATIENT
Start: 2024-01-25 | End: 2024-01-25

## 2024-01-25 RX ORDER — BENZOCAINE AND MENTHOL 5; 1 G/100ML; G/100ML
1 LIQUID ORAL ONCE
Refills: 0 | Status: COMPLETED | OUTPATIENT
Start: 2024-01-25 | End: 2024-01-25

## 2024-01-25 RX ORDER — SODIUM CHLORIDE 9 MG/ML
1000 INJECTION, SOLUTION INTRAVENOUS
Refills: 0 | Status: DISCONTINUED | OUTPATIENT
Start: 2024-01-25 | End: 2024-01-25

## 2024-01-25 RX ORDER — TIOTROPIUM BROMIDE AND OLODATEROL 3.124; 2.736 UG/1; UG/1
2 SPRAY, METERED RESPIRATORY (INHALATION)
Refills: 0 | DISCHARGE

## 2024-01-25 RX ORDER — ACETAMINOPHEN 500 MG
2 TABLET ORAL
Refills: 0 | DISCHARGE

## 2024-01-25 RX ORDER — ACETAMINOPHEN 500 MG
1000 TABLET ORAL EVERY 6 HOURS
Refills: 0 | Status: DISCONTINUED | OUTPATIENT
Start: 2024-01-25 | End: 2024-01-25

## 2024-01-25 RX ADMIN — Medication 1000 MILLIGRAM(S): at 15:38

## 2024-01-25 RX ADMIN — BENZOCAINE AND MENTHOL 1 LOZENGE: 5; 1 LIQUID ORAL at 15:01

## 2024-01-25 RX ADMIN — OXYCODONE HYDROCHLORIDE 5 MILLIGRAM(S): 5 TABLET ORAL at 15:59

## 2024-01-25 RX ADMIN — OXYCODONE HYDROCHLORIDE 5 MILLIGRAM(S): 5 TABLET ORAL at 15:29

## 2024-01-25 RX ADMIN — Medication 400 MILLIGRAM(S): at 15:23

## 2024-01-25 RX ADMIN — SODIUM CHLORIDE 100 MILLILITER(S): 9 INJECTION, SOLUTION INTRAVENOUS at 15:02

## 2024-01-25 NOTE — ASU DISCHARGE PLAN (ADULT/PEDIATRIC) - CARE PROVIDER_API CALL
Mina Haas  Surgery  [facility] [place] [address], [address]  [place] [address]  Phone: [phone]  Fax: [phone]  Follow Up Time: 1 week

## 2024-01-25 NOTE — BRIEF OPERATIVE NOTE - OPERATION/FINDINGS
Patient placed supine. Prepped and draped. Infraumbilical cutdown and 12 mm trochar placed - abdomen insufflated. 2 additional 8 mm robotic trochars placed in R and L flank and robot docked. R peritoneal flap created. Preperitoneal space dissected and cord structures identified and preserved. Findings of XXX - manually reduced. BARD mesh placed over defects and flap closed with running Quill. Similar procedure completed for L side with findings of XX. Hemostasis achieved. Trochars removed and robot undocked. Midline fascia closed with vicryl and incisions closed with monocryl. Patient placed supine. Prepped and draped. Infraumbilical cutdown and 12 mm trochar placed - abdomen insufflated. 2 additional 8 mm robotic trochars placed in R and L flank and robot docked. R peritoneal flap created. Preperitoneal space dissected and cord structures identified and preserved. Findings of R indirect inguinal hernia- manually reduced. BARD mesh placed over defects and secured with 2-0 vicryl. Flap closed with running Vlock suture. Similar procedure completed for L side with findings of small direct hernia. Hemostasis achieved. Trochars removed and robot undocked. Midline fascia closed with 0 vicryl and incisions closed with monocryl. Patient placed supine. Prepped and draped. Infraumbilical cutdown and 12 mm trochar placed - abdomen insufflated. 2 additional 8 mm robotic trochars placed in R and L flank and robot docked. R peritoneal flap created. Preperitoneal space dissected and cord structures identified and preserved. Findings of R indirect inguinal hernia- manually reduced. Lipoma of R spermatic cord identified and excised. BARD mesh placed over defects and secured with 2-0 vicryl. Flap closed with running Vlock suture. Similar procedure completed for L side with findings of small direct hernia. Hemostasis achieved. Trochars removed and robot undocked. Midline fascia closed with 0 vicryl and incisions closed with monocryl. Patient placed supine. Prepped and draped. Infraumbilical cutdown and 12 mm trochar placed - abdomen insufflated. 2 additional 8 mm robotic trochars placed in R and L flank and robot docked. R peritoneal flap created. Preperitoneal space dissected and cord structures identified and preserved. Findings of R indirect inguinal hernia- manually reduced. Lipoma of R spermatic cord identified and excised. BARD mesh placed over defects and secured with 2-0 vicryl. Flap closed with running Vlock suture. Similar procedure completed for L side with findings of small indirect   hernia. Hemostasis achieved. Trochars removed and robot undocked. Midline fascia closed with 0 vicryl and incisions closed with monocryl.

## 2024-01-25 NOTE — BRIEF OPERATIVE NOTE - NSICDXBRIEFPROCEDURE_GEN_ALL_CORE_FT
PROCEDURES:  Robot-assisted laparoscopic bilateral inguinal herniorrhaphy 25-Jan-2024 10:21:39  Tone Valladares

## 2024-01-25 NOTE — DISCHARGE NOTE NURSING/CASE MANAGEMENT/SOCIAL WORK - PATIENT PORTAL LINK FT
You can access the FollowMyHealth Patient Portal offered by [facility] by registering at the following website: [email or web address]. By joining [facility]’s FollowMyHealth portal, you will also be able to view your health information using other applications (apps) compatible with our system.

## 2024-01-25 NOTE — ASU DISCHARGE PLAN (ADULT/PEDIATRIC) - ASU DC SPECIAL INSTRUCTIONSFT
General Discharge Instructions:  Please resume all regular home medications unless specifically advised not to take a particular medication. Also, please take any new medications as prescribed.  Please get plenty of rest, continue to ambulate several times per day, and drink adequate amounts of fluids. Avoid lifting weights greater than 5-10 lbs until you follow-up with your surgeon, who will instruct you further regarding activity restrictions.  Avoid driving or operating heavy machinery while taking pain medications.  Please follow-up with your surgeon and Primary Care Provider (PCP) as advised.  Incision Care:  *Please call your doctor or nurse practitioner if you have increased pain, swelling, redness, or drainage from the incision site.  *Avoid swimming and baths until your follow-up appointment.  *You may shower, and wash surgical incisions with a mild soap and warm water. Gently pat the area dry.  *You have dermabond, do not scrub; gently pat dry  Warning Signs:  Please call your doctor or nurse practitioner if you experience the following:  *You experience new chest pain, pressure, squeezing or tightness.  *New or worsening cough, shortness of breath, or wheeze.  *If you are vomiting and cannot keep down fluids or your medications.  *You are getting dehydrated due to continued vomiting, diarrhea, or other reasons. Signs of dehydration include dry mouth, rapid heartbeat, or feeling dizzy or faint when standing.  *You see blood or dark/black material when you vomit or have a bowel movement.  *You experience burning when you urinate, have blood in your urine, or experience a discharge.  *Your pain is not improving within 8-12 hours or is not gone within 24 hours. Call or return immediately if your pain is getting worse, changes location, or moves to your chest or back.  *You have shaking chills, or fever greater than 101.5 degrees Fahrenheit or 38 degrees Celsius.  *Any change in your symptoms, or any new symptoms that concern you.

## 2024-01-25 NOTE — DISCHARGE NOTE NURSING/CASE MANAGEMENT/SOCIAL WORK - NSDCVIVACCINE_GEN_ALL_CORE_FT
Tdap; 21-Sep-2016 19:18; Remy Parada (RN); [employer]; [id number]; IntraMuscular; Deltoid Left.; 0.5 milliLiter(s); VIS (VIS Published: 09-May-2013, VIS Presented: 21-Sep-2016);

## 2024-01-27 ENCOUNTER — INPATIENT (INPATIENT)
Facility: HOSPITAL | Age: 59
LOS: 0 days | Discharge: HOME | DRG: 948 | End: 2024-01-28
Attending: SURGERY | Admitting: SURGERY
Payer: COMMERCIAL

## 2024-01-27 VITALS
WEIGHT: 175.05 LBS | RESPIRATION RATE: 18 BRPM | DIASTOLIC BLOOD PRESSURE: 85 MMHG | OXYGEN SATURATION: 97 % | TEMPERATURE: 98 F | HEIGHT: 70 IN | SYSTOLIC BLOOD PRESSURE: 146 MMHG | HEART RATE: 82 BPM

## 2024-01-27 DIAGNOSIS — Z98.1 ARTHRODESIS STATUS: Chronic | ICD-10-CM

## 2024-01-27 DIAGNOSIS — Z98.890 OTHER SPECIFIED POSTPROCEDURAL STATES: Chronic | ICD-10-CM

## 2024-01-27 DIAGNOSIS — Z98.89 OTHER SPECIFIED POSTPROCEDURAL STATES: Chronic | ICD-10-CM

## 2024-01-27 PROBLEM — E78.5 HYPERLIPIDEMIA, UNSPECIFIED: Chronic | Status: ACTIVE | Noted: 2024-01-24

## 2024-01-27 LAB
ANION GAP SERPL CALC-SCNC: 10 MMOL/L — SIGNIFICANT CHANGE UP (ref 5–17)
APPEARANCE UR: CLEAR — SIGNIFICANT CHANGE UP
BACTERIA # UR AUTO: NEGATIVE /HPF — SIGNIFICANT CHANGE UP
BASOPHILS # BLD AUTO: 0.07 K/UL — SIGNIFICANT CHANGE UP (ref 0–0.2)
BASOPHILS NFR BLD AUTO: 0.5 % — SIGNIFICANT CHANGE UP (ref 0–2)
BILIRUB UR-MCNC: NEGATIVE — SIGNIFICANT CHANGE UP
BUN SERPL-MCNC: 13 MG/DL — SIGNIFICANT CHANGE UP (ref 7–23)
CALCIUM SERPL-MCNC: 9.1 MG/DL — SIGNIFICANT CHANGE UP (ref 8.4–10.5)
CAST: 0 /LPF — SIGNIFICANT CHANGE UP (ref 0–4)
CHLORIDE SERPL-SCNC: 102 MMOL/L — SIGNIFICANT CHANGE UP (ref 96–108)
CO2 SERPL-SCNC: 26 MMOL/L — SIGNIFICANT CHANGE UP (ref 22–31)
COLOR SPEC: YELLOW — SIGNIFICANT CHANGE UP
CREAT SERPL-MCNC: 0.83 MG/DL — SIGNIFICANT CHANGE UP (ref 0.5–1.3)
DIFF PNL FLD: ABNORMAL
EGFR: 103 ML/MIN/1.73M2 — SIGNIFICANT CHANGE UP
EOSINOPHIL # BLD AUTO: 0.22 K/UL — SIGNIFICANT CHANGE UP (ref 0–0.5)
EOSINOPHIL NFR BLD AUTO: 1.5 % — SIGNIFICANT CHANGE UP (ref 0–6)
GLUCOSE SERPL-MCNC: 109 MG/DL — HIGH (ref 70–99)
GLUCOSE UR QL: NEGATIVE MG/DL — SIGNIFICANT CHANGE UP
HCT VFR BLD CALC: 42.9 % — SIGNIFICANT CHANGE UP (ref 39–50)
HGB BLD-MCNC: 14.4 G/DL — SIGNIFICANT CHANGE UP (ref 13–17)
IMM GRANULOCYTES NFR BLD AUTO: 0.4 % — SIGNIFICANT CHANGE UP (ref 0–0.9)
KETONES UR-MCNC: 15 MG/DL
LACTATE SERPL-SCNC: 1 MMOL/L — SIGNIFICANT CHANGE UP (ref 0.5–2)
LEUKOCYTE ESTERASE UR-ACNC: NEGATIVE — SIGNIFICANT CHANGE UP
LIDOCAIN IGE QN: 17 U/L — SIGNIFICANT CHANGE UP (ref 7–60)
LYMPHOCYTES # BLD AUTO: 30.7 % — SIGNIFICANT CHANGE UP (ref 13–44)
LYMPHOCYTES # BLD AUTO: 4.44 K/UL — HIGH (ref 1–3.3)
MCHC RBC-ENTMCNC: 29.9 PG — SIGNIFICANT CHANGE UP (ref 27–34)
MCHC RBC-ENTMCNC: 33.6 GM/DL — SIGNIFICANT CHANGE UP (ref 32–36)
MCV RBC AUTO: 89 FL — SIGNIFICANT CHANGE UP (ref 80–100)
MONOCYTES # BLD AUTO: 0.99 K/UL — HIGH (ref 0–0.9)
MONOCYTES NFR BLD AUTO: 6.8 % — SIGNIFICANT CHANGE UP (ref 2–14)
NEUTROPHILS # BLD AUTO: 8.68 K/UL — HIGH (ref 1.8–7.4)
NEUTROPHILS NFR BLD AUTO: 60.1 % — SIGNIFICANT CHANGE UP (ref 43–77)
NITRITE UR-MCNC: NEGATIVE — SIGNIFICANT CHANGE UP
NRBC # BLD: 0 /100 WBCS — SIGNIFICANT CHANGE UP (ref 0–0)
PH UR: 6 — SIGNIFICANT CHANGE UP (ref 5–8)
PLATELET # BLD AUTO: 259 K/UL — SIGNIFICANT CHANGE UP (ref 150–400)
POTASSIUM SERPL-MCNC: 3.8 MMOL/L — SIGNIFICANT CHANGE UP (ref 3.5–5.3)
POTASSIUM SERPL-SCNC: 3.8 MMOL/L — SIGNIFICANT CHANGE UP (ref 3.5–5.3)
PROT UR-MCNC: NEGATIVE MG/DL — SIGNIFICANT CHANGE UP
RBC # BLD: 4.82 M/UL — SIGNIFICANT CHANGE UP (ref 4.2–5.8)
RBC # FLD: 13.1 % — SIGNIFICANT CHANGE UP (ref 10.3–14.5)
RBC CASTS # UR COMP ASSIST: 6 /HPF — HIGH (ref 0–4)
SODIUM SERPL-SCNC: 138 MMOL/L — SIGNIFICANT CHANGE UP (ref 135–145)
SP GR SPEC: >1.03 — HIGH (ref 1–1.03)
SQUAMOUS # UR AUTO: 0 /HPF — SIGNIFICANT CHANGE UP (ref 0–5)
UROBILINOGEN FLD QL: 0.2 MG/DL — SIGNIFICANT CHANGE UP (ref 0.2–1)
WBC # BLD: 14.46 K/UL — HIGH (ref 3.8–10.5)
WBC # FLD AUTO: 14.46 K/UL — HIGH (ref 3.8–10.5)
WBC UR QL: 1 /HPF — SIGNIFICANT CHANGE UP (ref 0–5)

## 2024-01-27 PROCEDURE — 99285 EMERGENCY DEPT VISIT HI MDM: CPT

## 2024-01-27 PROCEDURE — 93010 ELECTROCARDIOGRAM REPORT: CPT

## 2024-01-27 PROCEDURE — 71045 X-RAY EXAM CHEST 1 VIEW: CPT | Mod: 26

## 2024-01-27 PROCEDURE — 74177 CT ABD & PELVIS W/CONTRAST: CPT | Mod: 26,MG

## 2024-01-27 PROCEDURE — 74019 RADEX ABDOMEN 2 VIEWS: CPT | Mod: 26

## 2024-01-27 PROCEDURE — 71250 CT THORAX DX C-: CPT | Mod: 26,MG

## 2024-01-27 PROCEDURE — G1004: CPT

## 2024-01-27 RX ORDER — KETOROLAC TROMETHAMINE 30 MG/ML
15 SYRINGE (ML) INJECTION ONCE
Refills: 0 | Status: DISCONTINUED | OUTPATIENT
Start: 2024-01-27 | End: 2024-01-27

## 2024-01-27 RX ORDER — HYDROMORPHONE HYDROCHLORIDE 2 MG/ML
0.5 INJECTION INTRAMUSCULAR; INTRAVENOUS; SUBCUTANEOUS ONCE
Refills: 0 | Status: DISCONTINUED | OUTPATIENT
Start: 2024-01-27 | End: 2024-01-27

## 2024-01-27 RX ORDER — MORPHINE SULFATE 50 MG/1
4 CAPSULE, EXTENDED RELEASE ORAL ONCE
Refills: 0 | Status: DISCONTINUED | OUTPATIENT
Start: 2024-01-27 | End: 2024-01-27

## 2024-01-27 RX ORDER — NICOTINE POLACRILEX 2 MG
1 GUM BUCCAL DAILY
Refills: 0 | Status: DISCONTINUED | OUTPATIENT
Start: 2024-01-27 | End: 2024-01-28

## 2024-01-27 RX ORDER — KETOROLAC TROMETHAMINE 30 MG/ML
15 SYRINGE (ML) INJECTION EVERY 8 HOURS
Refills: 0 | Status: DISCONTINUED | OUTPATIENT
Start: 2024-01-27 | End: 2024-01-28

## 2024-01-27 RX ORDER — IOHEXOL 300 MG/ML
30 INJECTION, SOLUTION INTRAVENOUS ONCE
Refills: 0 | Status: COMPLETED | OUTPATIENT
Start: 2024-01-27 | End: 2024-01-27

## 2024-01-27 RX ORDER — HEPARIN SODIUM 5000 [USP'U]/ML
5000 INJECTION INTRAVENOUS; SUBCUTANEOUS EVERY 8 HOURS
Refills: 0 | Status: DISCONTINUED | OUTPATIENT
Start: 2024-01-27 | End: 2024-01-28

## 2024-01-27 RX ORDER — INFLUENZA VIRUS VACCINE 15; 15; 15; 15 UG/.5ML; UG/.5ML; UG/.5ML; UG/.5ML
0.5 SUSPENSION INTRAMUSCULAR ONCE
Refills: 0 | Status: DISCONTINUED | OUTPATIENT
Start: 2024-01-27 | End: 2024-01-28

## 2024-01-27 RX ORDER — ONDANSETRON 8 MG/1
4 TABLET, FILM COATED ORAL EVERY 6 HOURS
Refills: 0 | Status: DISCONTINUED | OUTPATIENT
Start: 2024-01-27 | End: 2024-01-28

## 2024-01-27 RX ORDER — ACETAMINOPHEN 500 MG
1000 TABLET ORAL ONCE
Refills: 0 | Status: COMPLETED | OUTPATIENT
Start: 2024-01-27 | End: 2024-01-27

## 2024-01-27 RX ORDER — SODIUM CHLORIDE 9 MG/ML
1000 INJECTION, SOLUTION INTRAVENOUS
Refills: 0 | Status: DISCONTINUED | OUTPATIENT
Start: 2024-01-27 | End: 2024-01-28

## 2024-01-27 RX ORDER — ONDANSETRON 8 MG/1
4 TABLET, FILM COATED ORAL ONCE
Refills: 0 | Status: COMPLETED | OUTPATIENT
Start: 2024-01-27 | End: 2024-01-27

## 2024-01-27 RX ADMIN — Medication 1000 MILLIGRAM(S): at 19:47

## 2024-01-27 RX ADMIN — Medication 1 PATCH: at 17:59

## 2024-01-27 RX ADMIN — Medication 15 MILLIGRAM(S): at 12:41

## 2024-01-27 RX ADMIN — MORPHINE SULFATE 4 MILLIGRAM(S): 50 CAPSULE, EXTENDED RELEASE ORAL at 07:24

## 2024-01-27 RX ADMIN — Medication 1 PATCH: at 17:56

## 2024-01-27 RX ADMIN — HYDROMORPHONE HYDROCHLORIDE 0.5 MILLIGRAM(S): 2 INJECTION INTRAMUSCULAR; INTRAVENOUS; SUBCUTANEOUS at 06:06

## 2024-01-27 RX ADMIN — HYDROMORPHONE HYDROCHLORIDE 0.5 MILLIGRAM(S): 2 INJECTION INTRAMUSCULAR; INTRAVENOUS; SUBCUTANEOUS at 11:10

## 2024-01-27 RX ADMIN — SODIUM CHLORIDE 120 MILLILITER(S): 9 INJECTION, SOLUTION INTRAVENOUS at 16:30

## 2024-01-27 RX ADMIN — HYDROMORPHONE HYDROCHLORIDE 0.5 MILLIGRAM(S): 2 INJECTION INTRAMUSCULAR; INTRAVENOUS; SUBCUTANEOUS at 07:24

## 2024-01-27 RX ADMIN — Medication 400 MILLIGRAM(S): at 19:01

## 2024-01-27 RX ADMIN — Medication 15 MILLIGRAM(S): at 16:55

## 2024-01-27 RX ADMIN — ONDANSETRON 4 MILLIGRAM(S): 8 TABLET, FILM COATED ORAL at 05:26

## 2024-01-27 RX ADMIN — Medication 400 MILLIGRAM(S): at 12:41

## 2024-01-27 RX ADMIN — Medication 1000 MILLIGRAM(S): at 16:55

## 2024-01-27 RX ADMIN — HYDROMORPHONE HYDROCHLORIDE 0.5 MILLIGRAM(S): 2 INJECTION INTRAMUSCULAR; INTRAVENOUS; SUBCUTANEOUS at 11:40

## 2024-01-27 RX ADMIN — HEPARIN SODIUM 5000 UNIT(S): 5000 INJECTION INTRAVENOUS; SUBCUTANEOUS at 22:22

## 2024-01-27 RX ADMIN — HEPARIN SODIUM 5000 UNIT(S): 5000 INJECTION INTRAVENOUS; SUBCUTANEOUS at 16:30

## 2024-01-27 RX ADMIN — MORPHINE SULFATE 4 MILLIGRAM(S): 50 CAPSULE, EXTENDED RELEASE ORAL at 05:26

## 2024-01-27 RX ADMIN — IOHEXOL 30 MILLILITER(S): 300 INJECTION, SOLUTION INTRAVENOUS at 06:01

## 2024-01-27 NOTE — ED ADULT NURSE NOTE - NSFALLUNIVINTERV_ED_ALL_ED
Bed/Stretcher in lowest position, wheels locked, appropriate side rails in place/Call bell, personal items and telephone in reach/Instruct patient to call for assistance before getting out of bed/chair/stretcher/Non-slip footwear applied when patient is off stretcher/[place] to call system/Physically safe environment - no spills, clutter or unnecessary equipment/Purposeful proactive rounding/Room/bathroom lighting operational, light cord in reach

## 2024-01-27 NOTE — ED PROVIDER NOTE - OBJECTIVE STATEMENT
59 yo male with h/o MS, HLD, 2 days s/p b/l inguinal hernia repair at [facility] present to Er c/o severe abdominal pain that started tonight. Pt reports his abdomen is distended and very painful. He is not passing gas. Pt also noted bulging on both side of his neck that are not painful. Denies fever, chills, vomiting, mentioned intermittent nausea.

## 2024-01-27 NOTE — CHART NOTE - NSCHARTNOTEFT_GEN_A_CORE
Serial Abdominal Exam 10PM    Subjective: Patient states that he is greatly improving from prior exam. Abdominal pain is present but greatly abating.   -N/-V. +F (5 more times) /-BM    Physical Exam  Gen: Well appeaing, NAD  Abd: Soft, Mildly tender, Mildly-distended. TTP and distention both improved from prior exam    Plan to continue with abdominal examinations in AM, encouraged overnight ambulation. If feels need to pass BM, no straining.     DK Statement Selected

## 2024-01-27 NOTE — ED PROVIDER NOTE - CLINICAL SUMMARY MEDICAL DECISION MAKING FREE TEXT BOX
57 yo male with h/o MS, HLD, 2 days post -op b/l inguinal hernia repair in the ER with severe abdominal pain and abdominal distention. not passing gas. on exam- pt in distress due to pain, diffuse abdominal TTP with guarding, subcutaneous emphysema in the neck region b/l. findings concerned for possible perforation. labs done and pending imaging. Surgery at the bedside.

## 2024-01-27 NOTE — ED ADULT TRIAGE NOTE - CHIEF COMPLAINT QUOTE
Pt presents with c/o "uncontrolled pain, bloating and constipation" s/p bilateral hernia repair 1/25/24. Pt has been taking oxycodone with no relief, called to Dr. Dewitt's office with no reponse. Pt diaphoretic in triage, c/o "10/10" pain.

## 2024-01-27 NOTE — H&P ADULT - NSHPPHYSICALEXAM_GEN_ALL_CORE
Physical Exam:  General: Resting in bed, uncomfortable appearing  HEENT: B/L soft tissue swelling in supraclavicular region that is soft, without crepitus. Nontender.   Neuro: A&Ox3, no focal deficits  CV: NSR  Pulm: Equal chest wall expansion b/l, no respiratory distress  Abdomen: Soft, moderate distention, diffusely tender to palpation, mostly noteable in b/l lower quadrants with voluntary guarding. No rebound. Groins soft b/l without ecchymosis.  Rectal: no stool in rectal vault. Distended rectum.   Extremities: WWP, No edema

## 2024-01-27 NOTE — ED ADULT NURSE REASSESSMENT NOTE - NS ED NURSE REASSESS COMMENT FT1
Pt received from night shift RN. Pt is awake and alert and conversive in full sentences. Awaiting CT result. Assessment ongoing.

## 2024-01-27 NOTE — ED PROVIDER NOTE - RESPIRATORY, MLM
Breath sounds clear and equal bilaterally. no wheezes, no crackles, notable subcutaneous emphysema b/l neck

## 2024-01-27 NOTE — ED ADULT NURSE NOTE - OBJECTIVE STATEMENT
Patient is a 58yoM presenting to the ED with abd pain. Pt is axox3, spont breathing. Patient states that he had b/l hernia repair 2 days ago, has been having constant pain, worse tonight. Endorsing 10/10 pain, also reports that he has been constipated as well, not passing gas wither, denies chest pain/sob. Wife who is at the bedside states that patient has been having b/l bulging lumps on his neck, patient denies pain at the site. Denies diff breathing, denies chest pain.

## 2024-01-27 NOTE — ED PROVIDER NOTE - ATTENDING APP SHARED VISIT CONTRIBUTION OF CARE
58M hx MS, high chol, s/p b/l inguinal hernia repair 2 days ago with worsening lower abd pain. states started tonight, feeling bloated. no vomiting. no fevers. occasional nausea. also c/o bulging to both sides of lower neck, no pain, no chest pain, no SOB.   gen- uncomfortable  heent- ncat, clear conj, neck supple, soft nontender swelling to b/l base of neck  cv -rrr  lungs -ctab  abd - diffuse ttp  neuro -aox3,[place]  diffuse abd pain, s/p recent surgery. surgery consulted. will get CT c/a/p, given pain meds, zofran. labs sent

## 2024-01-27 NOTE — ED PROVIDER NOTE - SKIN, MLM
Skin normal color for race, warm, dry and intact. No evidence of rash.  localized erythema  below the umbilicus at the surgical incision site

## 2024-01-27 NOTE — H&P ADULT - NSHPLABSRESULTS_GEN_ALL_CORE
LABS:                        14.4   14.46 )-----------( 259      ( 27 Jan 2024 05:20 )             42.9     01-27    138  |  102  |  13  ----------------------------<  109<H>  3.8   |  26  |  0.83    Ca    9.1      27 Jan 2024 05:20    TPro  7.2  /  Alb  4.0  /  TBili  0.4  /  DBili  <0.2  /  AST  17  /  ALT  15  /  AlkPhos  69  01-27      CT Abdomen and Pelvis w/ Oral Cont and w/ IV Cont:   ACC: [id number] EXAM:  CT ABDOMEN AND PELVIS OC IC   ORDERED BY: MARCELA SMALLS     PROCEDURE DATE:  01/27/2024          INTERPRETATION:  CLINICAL INFORMATION: Severe abdominal pain. Status post   bilateral inguinal hernia repair on 1/25/2024.    COMPARISON: Same day CT chest    CONTRAST/COMPLICATIONS:  IV Contrast: Isovue 370  100 cc administered  Oral Contrast: Omnipaque 300  Complications: None reported at time of study completion    PROCEDURE:  CT of the Abdomen and Pelvis was performed.  Sagittal and coronal reformats were performed.    FINDINGS:  LOWER CHEST: Bibasilar atelectasis.    LIVER: A few scattered subcentimeter hypodensities, too small to   characterize.  BILE DUCTS: Normal caliber.  GALLBLADDER: Within normal limits.  SPLEEN: Within normal limits.  PANCREAS: Within normal limits.  ADRENALS: Within normal limits.  KIDNEYS/URETERS: Within normal limits.    BLADDER: Small pocket of intraluminal gas, most likely from recent   catheterization.  REPRODUCTIVE ORGANS: Prostate within normal limits.    BOWEL: Colonic diverticulosis. No bowel obstruction. Appendix is normal.  PERITONEUM: Small pockets of extraluminal gas, predominantly in the left   upper quadrant. No ascites.  VESSELS: Within normal limits.  RETROPERITONEUM/LYMPH NODES: No lymphadenopathy.  ABDOMINAL WALL: Scattered areas of subcutaneous gas extending inferiorly   into the inguinal regions and scrotum. Pockets of gas extend superiorly   into the chest wall bilaterally.  BONES: Degenerative changes. Fusion hardware noted at the cervical spine   on  view.    IMPRESSION:    1. Scattered areas of subcutaneous gas in the imaged lower chest,   abdomen, and pelvis, slightly more than expected postoperatively.   Correlate with appearance of surgical wounds and physical exam.  2. Mild pneumoperitoneum consistent with postsurgical changes.  3. Small pocket of gas in the bladder, most likely from recent   catheterization. Correlate for cystitis.        --- End of Report ---            AGUSTIN PENA MD; Attending Radiologist  This document has been electronically signed. Jan 27 2024  9:43AM (01-27-24 @ 08:45)

## 2024-01-27 NOTE — CHART NOTE - NSCHARTNOTEFT_GEN_A_CORE
Serial Abdominal Exam 6PM    Subjective: Patient states that he is improving because he has slightly less perceived abdominal distention and less pain. Abdominal pain is still present most notably in the  LLQ and RLQ. -N/-V. +F (Small x2) /-BM    Physical Exam  Gen: Well appeaing, NAD  Abd: Soft, moderately tender, mild/mod-distended. Incisions CDI     Plan to continue with abdominal examinations    DK

## 2024-01-27 NOTE — PATIENT PROFILE ADULT - FALL HARM RISK - UNIVERSAL INTERVENTIONS
Bed in lowest position, wheels locked, appropriate side rails in place/Call bell, personal items and telephone in reach/Instruct patient to call for assistance before getting out of bed or chair/Non-slip footwear when patient is out of bed/[place] to call system/Physically safe environment - no spills, clutter or unnecessary equipment/Purposeful Proactive Rounding/Room/bathroom lighting operational, light cord in reach

## 2024-01-27 NOTE — H&P ADULT - ASSESSMENT
58M with PMHx of MS and HLD and PSHx of numerous spinal and orthopaedic procedures, with recent RA b/l IHR with mesh (Dr. Haas, 1/25) who presents to [facility] for evaluation of recurrent adbominal pain since surgery. Patient afebrile, HD stable on presentation with leukocytosis noted. CTAP showing scattered areas of subcutaneous gas in the imaged lower chest, abdomen, and pelvis, slightly more than expected postoperatively. Correlate with appearance of surgical wounds and physical exam. Mild pneumoperitoneum consistent with postsurgical changes. Small pocket of gas in the bladder, most likely from recent catheterization. Correlate for cystitis. Will admit secondary to pain/ discomfort out of proportion to CT findings for closer monitoring and serial exams.     Plan:  - admit to telemetry, Dr. Siu  - NPO/IVF  - FAIZA q4  - limit narcotics, tylenol/toradol for pain  - Zofran prn for nausea  - Low threshold for NGT if vomits  - OOBA/IS  - SQH, SCDs  - home meds as appropriate  - AM labs

## 2024-01-27 NOTE — PATIENT PROFILE ADULT - NSPROPTRIGHTBILLOFRIGHTS_GEN_A_NUR
From: Judith Gonzales  To: Malina Da Silva  Sent: 4/18/2023 10:17 AM CDT  Subject: Question regarding XR ANKLE LEFT AP LATERAL AND OBLIQUE    Yes I would like to get the referral for the podiatrist and I am currently sitting with my ankle at rest after working two days of work a total of 12 hours and I can barely walk on it  
Referral placed per result note.   
patient

## 2024-01-27 NOTE — H&P ADULT - NSICDXPASTSURGICALHX_GEN_ALL_CORE_FT
PAST SURGICAL HISTORY:  H/O spinal fusion C5-6-7 x2 ( 2010, 2023)    History of shoulder surgery right shoulder X 2    S/P foot surgery L    Status post medial meniscus repair R

## 2024-01-27 NOTE — H&P ADULT - HISTORY OF PRESENT ILLNESS
Patient is a 58M with PMHx of MS and HLD and PSHx of numerous spinal and orthopaedic procedures, with recent RA b/l IHR with mesh (Dr. Haas, 1/25) who presents to [facility] for evaluation of recurrent adbominal pain since surgery.     Patient reports he voided after surgery on Thursday and was discharged home without issue. States since yesterday he has been having severe, lower abdominal pain and bloating. States pain is 10/10 at its worst, sharp and located mostly in lower abdomen. States pain is intermittent and occurs every 7-10 minutes and lasts for 10-15 seconds at a time. States he has been taking oxycodone without relief. Denies fevers or chills at home. Admits to mild nausea, but denies emesis. States he has not passed flatus or had a bowel movement since surgery. States he has been able void without issue. States he has not been able to use hernia belt 2/2 pain, but denies swelling in either groin. Patient also endorses b/l neck swelling since surgery.    In the ED, patient afebrile, uncomfortable appearing:  - VITALS: Afebrile T 97.8, /85, HR 82, saturating well on RA  - LABORATORY: WBC 14K with neutrophil predominence, Hb 14.4, Lactate 1. Electrolytes wnl  - IMAGING: CXR without pneumoperitoneum. CTAP revealed revealed free intraperitoneal air is identified within the upper abdomen, indeterminate etiology. SQ emphysema within the thoracic and abdominal regions without SQ emphysema noted within the visualized portions of the neck. Likely atelectatic change in BLL; underlying parenchymal infiltrate cannot be excluded.    Medical History: MS, HLD  Surgical History: B/L IHR, L foot surgery, R meniscus surgery, L shoulder surgery x2. C5-C7 spinal fusion, C4-C5 spinal fusion  Medications: as per med rec  Allergies: NKDA  Social History: Patient is an active smoker. Smokes 1 PPD. Denies alcohol use. Admits to occassional marajuna use. States he is retired  States last colonoscopy was >15 years and was reportedly normal.

## 2024-01-28 ENCOUNTER — TRANSCRIPTION ENCOUNTER (OUTPATIENT)
Age: 59
End: 2024-01-28

## 2024-01-28 VITALS
HEART RATE: 78 BPM | DIASTOLIC BLOOD PRESSURE: 78 MMHG | OXYGEN SATURATION: 97 % | RESPIRATION RATE: 17 BRPM | SYSTOLIC BLOOD PRESSURE: 147 MMHG | TEMPERATURE: 98 F

## 2024-01-28 LAB
ANION GAP SERPL CALC-SCNC: 9 MMOL/L — SIGNIFICANT CHANGE UP (ref 5–17)
APTT BLD: 31.8 SEC — SIGNIFICANT CHANGE UP (ref 24.5–35.6)
BLD GP AB SCN SERPL QL: NEGATIVE — SIGNIFICANT CHANGE UP
BUN SERPL-MCNC: 13 MG/DL — SIGNIFICANT CHANGE UP (ref 7–23)
CALCIUM SERPL-MCNC: 8.4 MG/DL — SIGNIFICANT CHANGE UP (ref 8.4–10.5)
CHLORIDE SERPL-SCNC: 104 MMOL/L — SIGNIFICANT CHANGE UP (ref 96–108)
CO2 SERPL-SCNC: 25 MMOL/L — SIGNIFICANT CHANGE UP (ref 22–31)
CREAT SERPL-MCNC: 0.75 MG/DL — SIGNIFICANT CHANGE UP (ref 0.5–1.3)
EGFR: 106 ML/MIN/1.73M2 — SIGNIFICANT CHANGE UP
GLUCOSE SERPL-MCNC: 84 MG/DL — SIGNIFICANT CHANGE UP (ref 70–99)
HCT VFR BLD CALC: 40.4 % — SIGNIFICANT CHANGE UP (ref 39–50)
HGB BLD-MCNC: 13.6 G/DL — SIGNIFICANT CHANGE UP (ref 13–17)
INR BLD: 1.04 — SIGNIFICANT CHANGE UP (ref 0.85–1.18)
MAGNESIUM SERPL-MCNC: 2 MG/DL — SIGNIFICANT CHANGE UP (ref 1.6–2.6)
MCHC RBC-ENTMCNC: 30.2 PG — SIGNIFICANT CHANGE UP (ref 27–34)
MCHC RBC-ENTMCNC: 33.7 GM/DL — SIGNIFICANT CHANGE UP (ref 32–36)
MCV RBC AUTO: 89.8 FL — SIGNIFICANT CHANGE UP (ref 80–100)
NRBC # BLD: 0 /100 WBCS — SIGNIFICANT CHANGE UP (ref 0–0)
PHOSPHATE SERPL-MCNC: 2.3 MG/DL — LOW (ref 2.5–4.5)
PLATELET # BLD AUTO: 232 K/UL — SIGNIFICANT CHANGE UP (ref 150–400)
POTASSIUM SERPL-MCNC: 3.7 MMOL/L — SIGNIFICANT CHANGE UP (ref 3.5–5.3)
POTASSIUM SERPL-SCNC: 3.7 MMOL/L — SIGNIFICANT CHANGE UP (ref 3.5–5.3)
PROTHROM AB SERPL-ACNC: 11.8 SEC — SIGNIFICANT CHANGE UP (ref 9.5–13)
RBC # BLD: 4.5 M/UL — SIGNIFICANT CHANGE UP (ref 4.2–5.8)
RBC # FLD: 12.6 % — SIGNIFICANT CHANGE UP (ref 10.3–14.5)
RH IG SCN BLD-IMP: POSITIVE — SIGNIFICANT CHANGE UP
SODIUM SERPL-SCNC: 138 MMOL/L — SIGNIFICANT CHANGE UP (ref 135–145)
WBC # BLD: 9.92 K/UL — SIGNIFICANT CHANGE UP (ref 3.8–10.5)
WBC # FLD AUTO: 9.92 K/UL — SIGNIFICANT CHANGE UP (ref 3.8–10.5)

## 2024-01-28 PROCEDURE — 84100 ASSAY OF PHOSPHORUS: CPT

## 2024-01-28 PROCEDURE — 83735 ASSAY OF MAGNESIUM: CPT

## 2024-01-28 PROCEDURE — 85027 COMPLETE CBC AUTOMATED: CPT

## 2024-01-28 PROCEDURE — 83605 ASSAY OF LACTIC ACID: CPT

## 2024-01-28 PROCEDURE — 71250 CT THORAX DX C-: CPT | Mod: MG

## 2024-01-28 PROCEDURE — G1004: CPT

## 2024-01-28 PROCEDURE — 86900 BLOOD TYPING SEROLOGIC ABO: CPT

## 2024-01-28 PROCEDURE — 71045 X-RAY EXAM CHEST 1 VIEW: CPT

## 2024-01-28 PROCEDURE — 80048 BASIC METABOLIC PNL TOTAL CA: CPT

## 2024-01-28 PROCEDURE — 36415 COLL VENOUS BLD VENIPUNCTURE: CPT

## 2024-01-28 PROCEDURE — G0378: CPT

## 2024-01-28 PROCEDURE — 85025 COMPLETE CBC W/AUTO DIFF WBC: CPT

## 2024-01-28 PROCEDURE — 96375 TX/PRO/DX INJ NEW DRUG ADDON: CPT

## 2024-01-28 PROCEDURE — 85610 PROTHROMBIN TIME: CPT

## 2024-01-28 PROCEDURE — 93005 ELECTROCARDIOGRAM TRACING: CPT

## 2024-01-28 PROCEDURE — 96374 THER/PROPH/DIAG INJ IV PUSH: CPT

## 2024-01-28 PROCEDURE — 86850 RBC ANTIBODY SCREEN: CPT

## 2024-01-28 PROCEDURE — 80076 HEPATIC FUNCTION PANEL: CPT

## 2024-01-28 PROCEDURE — 85730 THROMBOPLASTIN TIME PARTIAL: CPT

## 2024-01-28 PROCEDURE — 74019 RADEX ABDOMEN 2 VIEWS: CPT

## 2024-01-28 PROCEDURE — 99285 EMERGENCY DEPT VISIT HI MDM: CPT | Mod: 25

## 2024-01-28 PROCEDURE — 81001 URINALYSIS AUTO W/SCOPE: CPT

## 2024-01-28 PROCEDURE — 83690 ASSAY OF LIPASE: CPT

## 2024-01-28 PROCEDURE — 86901 BLOOD TYPING SEROLOGIC RH(D): CPT

## 2024-01-28 PROCEDURE — 74177 CT ABD & PELVIS W/CONTRAST: CPT | Mod: MG

## 2024-01-28 RX ORDER — POTASSIUM PHOSPHATE, MONOBASIC POTASSIUM PHOSPHATE, DIBASIC 236; 224 MG/ML; MG/ML
15 INJECTION, SOLUTION INTRAVENOUS ONCE
Refills: 0 | Status: COMPLETED | OUTPATIENT
Start: 2024-01-28 | End: 2024-01-28

## 2024-01-28 RX ORDER — ATORVASTATIN CALCIUM 80 MG/1
40 TABLET, FILM COATED ORAL AT BEDTIME
Refills: 0 | Status: DISCONTINUED | OUTPATIENT
Start: 2024-01-28 | End: 2024-01-28

## 2024-01-28 RX ADMIN — POTASSIUM PHOSPHATE, MONOBASIC POTASSIUM PHOSPHATE, DIBASIC 62.5 MILLIMOLE(S): 236; 224 INJECTION, SOLUTION INTRAVENOUS at 11:09

## 2024-01-28 RX ADMIN — Medication 1 PATCH: at 11:08

## 2024-01-28 RX ADMIN — HEPARIN SODIUM 5000 UNIT(S): 5000 INJECTION INTRAVENOUS; SUBCUTANEOUS at 05:57

## 2024-01-28 RX ADMIN — HEPARIN SODIUM 5000 UNIT(S): 5000 INJECTION INTRAVENOUS; SUBCUTANEOUS at 14:40

## 2024-01-28 NOTE — PROGRESS NOTE ADULT - ASSESSMENT
58M with PMHx of MS and HLD and PSHx of numerous spinal and orthopaedic procedures, with recent RA b/l IHR with mesh (Dr. Haas, 1/25) who presents to [facility] for evaluation of recurrent adbominal pain since surgery. Patient afebrile, HD stable on presentation with leukocytosis noted. WBC 14. CTAP showing scattered areas of subcutaneous gas in the imaged lower chest, abdomen, and pelvis, slightly more than expected postoperatively. Correlate with appearance of surgical wounds and physical exam. Mild pneumoperitoneum consistent with postsurgical changes. Small pocket of gas in the bladder, most likely from recent catheterization. Correlate for cystitis. Will admit secondary to pain/ discomfort out of proportion to CT findings for closer monitoring and serial exams.     Plan:  - admit to telemetry, Dr. Siu  - CLD/IVF  - FAIZA q4  - limit narcotics, tylenol/toradol for pain  - Zofran prn for nausea  - Low threshold for NGT if vomits  - OOBA/IS  - SQH, SCDs  - home meds as appropriate  - AM labs

## 2024-01-28 NOTE — DISCHARGE NOTE PROVIDER - NSDCFUADDINST_GEN_ALL_CORE_FT
Please follow up with Dr. Haas in 1-2 weeks, you may call [phone] to schedule an appointment.    Warning Signs:  Please call your doctor or nurse practitioner if you experience the following:  *You experience new chest pain, pressure, squeezing or tightness.  *New or worsening cough, shortness of breath, or wheeze.  *If you are vomiting and cannot keep down fluids or your medications.  *You are getting dehydrated due to continued vomiting, diarrhea, or other reasons. Signs of dehydration include dry mouth, rapid heartbeat, or feeling dizzy or faint when standing.  *You see blood or dark/black material when you vomit or have a bowel movement.  *You experience burning when you urinate, have blood in your urine, or experience a discharge.  *Your pain is not improving within 8-12 hours or is not gone within 24 hours. Call or return immediately if your pain is getting worse, changes location, or moves to your chest or back.  *You have shaking chills, or fever greater than 101.5 degrees Fahrenheit or 38 degrees Celsius.  *Any change in your symptoms, or any new symptoms that concern you.

## 2024-01-28 NOTE — DISCHARGE NOTE PROVIDER - CARE PROVIDER_API CALL
Mina Haas  Surgery  [facility] [place] [address], [address]  [place] [address]  Phone: [phone]  Fax: [phone]  Follow Up Time: 2 weeks

## 2024-01-28 NOTE — DISCHARGE NOTE NURSING/CASE MANAGEMENT/SOCIAL WORK - PATIENT PORTAL LINK FT
You can access the FollowMyHealth Patient Portal offered by  by registering at the following website: [email or web address]. By joining [employer]’s FollowMyHealth portal, you will also be able to view your health information using other applications (apps) compatible with our system.

## 2024-01-28 NOTE — DISCHARGE NOTE PROVIDER - NSDCMRMEDTOKEN_GEN_ALL_CORE_FT
Colace 100 mg oral capsule: 1 cap(s) orally once a day as needed for  constipation  oxyCODONE 5 mg oral tablet: 1 tab(s) orally every 6 hours as needed for  severe pain MDD: 4  rosuvastatin 10 mg oral capsule: 1 cap(s) orally once a day  Stiolto Respimat 10 ACT 2.5 mcg-2.5 mcg/inh inhalation aerosol: 2 inhaled once a day  Tylenol 500 mg oral tablet: 2 tab(s) orally prn

## 2024-01-28 NOTE — DISCHARGE NOTE PROVIDER - CARE PROVIDERS DIRECT ADDRESSES
DISPLAY PLAN FREE TEXT ,[email or web address]

## 2024-01-28 NOTE — DISCHARGE NOTE PROVIDER - NSDCCPCAREPLAN_GEN_ALL_CORE_FT
PRINCIPAL DISCHARGE DIAGNOSIS  Diagnosis: Post-operative pain  Assessment and Plan of Treatment:

## 2024-01-28 NOTE — CHART NOTE - NSCHARTNOTEFT_GEN_A_CORE
Patient seen and examined at bedside. Patient denies nausea and vomiting, he is tolerating his clear liquid diet. He states that the abdominal pain that he has presented with has almost entirely resolved.    Vital Signs Last 24 Hrs  T(C): 36.1 (28 Jan 2024 09:00), Max: 36.8 (27 Jan 2024 16:32)  T(F): 97 (28 Jan 2024 09:00), Max: 98.3 (27 Jan 2024 16:32)  HR: 73 (28 Jan 2024 09:00) (60 - 73)  BP: 141/75 (28 Jan 2024 09:00) (119/84 - 151/78)  BP(mean): 102 (28 Jan 2024 09:00) (90 - 102)  RR: 16 (28 Jan 2024 09:00) (16 - 18)  SpO2: 95% (28 Jan 2024 09:00) (95% - 99%)    Parameters below as of 28 Jan 2024 09:00  Patient On (Oxygen Delivery Method): room air      Abdomen is tender to palpation at his incisions, otherwise nontender. Moderately distended. No rebound or guarding.    Team 5 will continue to monitor and advance his diet as tolerated.

## 2024-01-28 NOTE — PROGRESS NOTE ADULT - SUBJECTIVE AND OBJECTIVE BOX
INTERVAL HPI/OVERNIGHT EVENTS: SAEs improving +F/-BM, Nicotine Patch    SUBJECTIVE:      heparin   Injectable 5000 Unit(s) SubCutaneous every 8 hours      Vital Signs Last 24 Hrs  T(C): 36.4 (28 Jan 2024 05:43), Max: 36.8 (27 Jan 2024 16:32)  T(F): 97.5 (28 Jan 2024 05:43), Max: 98.3 (27 Jan 2024 16:32)  HR: 69 (28 Jan 2024 05:43) (60 - 71)  BP: 130/66 (28 Jan 2024 05:43) (119/84 - 151/78)  BP(mean): 90 (28 Jan 2024 05:43) (90 - 101)  RR: 18 (28 Jan 2024 05:43) (16 - 18)  SpO2: 95% (28 Jan 2024 05:43) (95% - 99%)    Parameters below as of 28 Jan 2024 05:43  Patient On (Oxygen Delivery Method): room air      I&O's Detail    27 Jan 2024 07:01  -  28 Jan 2024 06:41  --------------------------------------------------------  IN:    Lactated Ringers: 1080 mL  Total IN: 1080 mL    OUT:    Voided (mL): 100 mL  Total OUT: 100 mL    Total NET: 980 mL          General: NAD, resting comfortably in bed  C/V: NSR  Pulm: Nonlabored breathing, no respiratory distress  Abd: soft, NT/ND.  Extrem: WWP, no edema, SCDs in place  Drains:  Bishop:      LABS:                        14.4   14.46 )-----------( 259      ( 27 Jan 2024 05:20 )             42.9     01-27    138  |  102  |  13  ----------------------------<  109<H>  3.8   |  26  |  0.83    Ca    9.1      27 Jan 2024 05:20    TPro  7.2  /  Alb  4.0  /  TBili  0.4  /  DBili  <0.2  /  AST  17  /  ALT  15  /  AlkPhos  69  01-27      Urinalysis Basic - ( 27 Jan 2024 05:20 )    Color: Yellow / Appearance: Clear / SG: >1.030 / pH: x  Gluc: 109 mg/dL / Ketone: 15 mg/dL  / Bili: Negative / Urobili: 0.2 mg/dL   Blood: x / Protein: Negative mg/dL / Nitrite: Negative   Leuk Esterase: Negative / RBC: 6 /HPF / WBC 1 /HPF   Sq Epi: x / Non Sq Epi: 0 /HPF / Bacteria: Negative /HPF        RADIOLOGY & ADDITIONAL STUDIES:   INTERVAL HPI/OVERNIGHT EVENTS: SAEs improving +F/-BM, Nicotine Patch    SUBJECTIVE: Patient seen and examined at bedside with chief resident. Patient states that his abdominal pain has improved markedly during admission. He denies nausea and vomiting. He endorses passing flatus but denies any bowel movements overnight.      heparin   Injectable 5000 Unit(s) SubCutaneous every 8 hours      Vital Signs Last 24 Hrs  T(C): 36.4 (28 Jan 2024 05:43), Max: 36.8 (27 Jan 2024 16:32)  T(F): 97.5 (28 Jan 2024 05:43), Max: 98.3 (27 Jan 2024 16:32)  HR: 69 (28 Jan 2024 05:43) (60 - 71)  BP: 130/66 (28 Jan 2024 05:43) (119/84 - 151/78)  BP(mean): 90 (28 Jan 2024 05:43) (90 - 101)  RR: 18 (28 Jan 2024 05:43) (16 - 18)  SpO2: 95% (28 Jan 2024 05:43) (95% - 99%)    Parameters below as of 28 Jan 2024 05:43  Patient On (Oxygen Delivery Method): room air      I&O's Detail    27 Jan 2024 07:01  -  28 Jan 2024 06:41  --------------------------------------------------------  IN:    Lactated Ringers: 1080 mL  Total IN: 1080 mL    OUT:    Voided (mL): 100 mL  Total OUT: 100 mL    Total NET: 980 mL          General: NAD, resting comfortably in bed  C/V: NSR  Pulm: Nonlabored breathing, no respiratory distress  Abd: soft, moderately tender in the RUQ, improving distention, no rebound or guarding   Extrem: WWP, no edema, SCDs in place      LABS:                        14.4   14.46 )-----------( 259      ( 27 Jan 2024 05:20 )             42.9     01-27    138  |  102  |  13  ----------------------------<  109<H>  3.8   |  26  |  0.83    Ca    9.1      27 Jan 2024 05:20    TPro  7.2  /  Alb  4.0  /  TBili  0.4  /  DBili  <0.2  /  AST  17  /  ALT  15  /  AlkPhos  69  01-27      Urinalysis Basic - ( 27 Jan 2024 05:20 )    Color: Yellow / Appearance: Clear / SG: >1.030 / pH: x  Gluc: 109 mg/dL / Ketone: 15 mg/dL  / Bili: Negative / Urobili: 0.2 mg/dL   Blood: x / Protein: Negative mg/dL / Nitrite: Negative   Leuk Esterase: Negative / RBC: 6 /HPF / WBC 1 /HPF   Sq Epi: x / Non Sq Epi: 0 /HPF / Bacteria: Negative /HPF        RADIOLOGY & ADDITIONAL STUDIES:

## 2024-01-28 NOTE — CHART NOTE - NSCHARTNOTEFT_GEN_A_CORE
Serial Abdominal Exam    Subjective: Patient states that they are improving still. More gas. Able to feel comfortable enough to sleep. Abdominal pain is present but as before, improving.   -N/-V. +F (Not counted due to sleeping but confirms he is passing flatus) /-BM    Physical Exam  Gen: Well appearing, NAD  Abd: Soft, minimally tender, minimally-distended. Abdomen is now significantly less distended but R side feels more tense upon palpation as compared to left, which is softer.     Plan to continue with abdominal examinations    DK

## 2024-01-28 NOTE — DISCHARGE NOTE PROVIDER - HOSPITAL COURSE
Patient is a 58M with past medical history of MS and HLD and past surgical history of numerous spinal and orthopaedic procedures, with recent RA b/l IHR with mesh (Dr. Haas, 1/25) who presents to [facility] for evaluation of recurrent abdominal pain since surgery. Patient afebrile, hemodynamically stable on presentation with leukocytosis noted. WBC 14. CTAP showing scattered areas of subcutaneous gas in the imaged lower chest, abdomen, and pelvis, slightly more than expected postoperatively. Correlate with appearance of surgical wounds and physical exam. Mild pneumoperitoneum consistent with postsurgical changes. Small pocket of gas in the bladder, most likely from recent catheterization. Correlate for cystitis. Will admit secondary to pain/ discomfort out of proportion to CT findings for closer monitoring and serial exams. The patient was admitted on 1/27 for serial abdominal exams and bowel rest. Overnight the patient reports passing flatus and improvement of his abdominal pain. On hospital day 1 he stated that his pain had almost completely resolved. He tolerated a clear liquid diet and was advanced to a regular diet.      Patient is a 58M with past medical history of MS and HLD and past surgical history of numerous spinal and orthopaedic procedures, with recent RA b/l IHR with mesh (Dr. Haas, 1/25) who presents to [facility] for evaluation of recurrent abdominal pain since surgery. Patient afebrile, hemodynamically stable on presentation with leukocytosis noted. WBC 14. CTAP showing scattered areas of subcutaneous gas in the imaged lower chest, abdomen, and pelvis, slightly more than expected postoperatively. Correlate with appearance of surgical wounds and physical exam. Mild pneumoperitoneum consistent with postsurgical changes. Will admit secondary to pain/ discomfort out of proportion to CT findings for closer monitoring and serial exams. The patient was admitted on 1/27 for serial abdominal exams and bowel rest. Overnight the patient reports passing flatus and improvement of his abdominal pain. On hospital day 1 he stated that his pain had almost completely resolved. He tolerated a clear liquid diet and was advanced to a regular diet which he tolerated without nausea or vomiting. On day of discharge the patient was tolerating his regular diet, his pain was well controlled, he was regularly passing flatus, and stable to be discharged home.

## 2024-01-28 NOTE — DISCHARGE NOTE NURSING/CASE MANAGEMENT/SOCIAL WORK - NSDCVIVACCINE_GEN_ALL_CORE_FT
Tdap; 21-Sep-2016 19:18; Remy Parada (RN); [employer]; [id number]; IntraMuscular; Deltoid Left.; 0.5 milliLiter(s); VIS (VIS Published: 09-May-2013, VIS Presented: 21-Sep-2016);

## 2024-02-01 LAB — SURGICAL PATHOLOGY STUDY: SIGNIFICANT CHANGE UP

## 2024-02-02 DIAGNOSIS — E78.5 HYPERLIPIDEMIA, UNSPECIFIED: ICD-10-CM

## 2024-02-02 DIAGNOSIS — K66.8 OTHER SPECIFIED DISORDERS OF PERITONEUM: ICD-10-CM
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
## 2024-02-02 DIAGNOSIS — T81.82XA EMPHYSEMA (SUBCUTANEOUS) RESULTING FROM A PROCEDURE, INITIAL ENCOUNTER: ICD-10-CM

## 2024-02-02 DIAGNOSIS — R10.9 UNSPECIFIED ABDOMINAL PAIN: ICD-10-CM

## 2024-02-02 DIAGNOSIS — F17.210 NICOTINE DEPENDENCE, CIGARETTES, UNCOMPLICATED: ICD-10-CM

## 2024-02-02 DIAGNOSIS — Z98.1 ARTHRODESIS STATUS: ICD-10-CM

## 2024-02-02 DIAGNOSIS — Z79.891 LONG TERM (CURRENT) USE OF OPIATE ANALGESIC: ICD-10-CM

## 2024-02-02 DIAGNOSIS — N30.90 CYSTITIS, UNSPECIFIED WITHOUT HEMATURIA: ICD-10-CM

## 2024-02-02 DIAGNOSIS — G89.18 OTHER ACUTE POSTPROCEDURAL PAIN: ICD-10-CM

## 2024-03-26 NOTE — PATIENT PROFILE ADULT - VISION (WITH CORRECTIVE LENSES IF THE PATIENT USUALLY WEARS THEM):
Him/He
Partially impaired: cannot see medication labels or newsprint, but can see obstacles in path, and the surrounding layout; can count fingers at arm's length

## 2025-07-09 NOTE — PATIENT PROFILE ADULT - FALL HARM RISK - DEVICES
HPI    Pt is here today for ocular health examination. Denies pain/discomfort.   Reports occasional redness and itchiness OU. Amblyopia OS.  DLS: A year and a half ago  (-)Flashes   (+)Floaters   (-)Diplopia   (-)Headaches   (+)Itching   (+)Tearing  (+)Burning  (+)Dryness   (-)Photophobia  (-)Glare   (-)Blurred VA  Past Eye Sx: (-)  Eye Meds: Lumify PRN OU                    OTC AT's PRN OU    Last edited by Nida Pepe, OD on 7/9/2025  9:27 AM.            Assessment /Plan     For exam results, see Encounter Report.    Nuclear sclerosis of both eyes    Dry eye syndrome of bilateral lacrimal glands      1. Educated pt on findings. Not visually significant. No need for removal at this time. Monitor yearly.      Eyeglass Final Rx       Eyeglass Final Rx         Sphere Cylinder Axis Add    Right +1.25 Sphere  +2.75    Left +2.25 +1.00 170 +2.75      Type: PAL    Expiration Date: 7/9/2026                   2. Educated pt on findings. Recommended Systane Gel/carmela QHS OU.  If symptoms worsen or dont improve, RTC. Monitor.      RTC in 1 year for annual eye exam unless changes noted sooner.    
[Finger Stick] : Finger Stick: Yes
[Hypoglycemia] : Patient is not hypoglycemic.
[FreeTextEntry9] : 
[FreeTextEntry1] : 42 year old male with a PMHx of DMII, HLD, obesity  Pt presents for cardiovascular prevention/risk factor management. He has been diabetic since at least 2020, currently on metformin and Jardiance. States he is unable to lose weight. He admits to a carb heavy diet but now switched to wheat bread and zero sugar soda and iced tea. Admits he is not exercising; says he does not have time because works too much and only has two days off a week. Was seen by ivory who tried ro get him GLP1 but pt states he did not hear back regarding authorization.   5/21/24: Hgb A1C 7.1 LDL 83   
None

## (undated) DEVICE — ELCTR BOVIE PENCIL HANDPIECE ROCKER SWITCH 15FT

## (undated) DEVICE — DRAPE LIGHT HANDLE COVER (BLUE)

## (undated) DEVICE — SLV COMPRESSION KNEE MED

## (undated) DEVICE — XI DRAPE COLUMN

## (undated) DEVICE — INSUFFLATION NDL COVIDIEN SURGINEEDLE VERESS 120MM

## (undated) DEVICE — MIDAS REX LEGEND BALL FLUTED MEDNEXT SM BORE 4.0MM X 10CM

## (undated) DEVICE — DRSG SUPPORTER ADULT 3" WAISTBAND MED

## (undated) DEVICE — DRAIN JACKSON PRATT 7MM FLAT FULL NO TROCAR

## (undated) DEVICE — DRAPE IOBAN 23" X 23"

## (undated) DEVICE — SUT VICRYL 3-0 27" SH UNDYED

## (undated) DEVICE — XI ARM FORCEP FENESTRATED BIPOLAR 8MM

## (undated) DEVICE — VAGINAL PACKING 2"

## (undated) DEVICE — GLV 6.5 PROTEXIS (WHITE)

## (undated) DEVICE — VENODYNE/SCD SLEEVE CALF MEDIUM

## (undated) DEVICE — WARMING BLANKET LOWER ADULT

## (undated) DEVICE — DRAPE SURGICAL #1010

## (undated) DEVICE — DRAPE 1/2 SHEET 40X57"

## (undated) DEVICE — SPONGE SURGICAL STRIP 1/2 X 6"

## (undated) DEVICE — SUT MAXON 0 30" GS-11

## (undated) DEVICE — DRAIN RESERVOIR FOR JACKSON PRATT 100CC CARDINAL

## (undated) DEVICE — DRAPE 3/4 SHEET 52X76"

## (undated) DEVICE — DRSG SUPPORTER ADULT 3" WAISTBAND LRG

## (undated) DEVICE — XI SEAL UNIV 5- 8 MM

## (undated) DEVICE — NDL SPINAL 18G X 3.5" (PINK)

## (undated) DEVICE — PACK GENERAL LAPAROSCOPY

## (undated) DEVICE — TROCAR COVIDIEN VERSAONE BLUNT TIP HASSAN 12MM

## (undated) DEVICE — D HELP - CLEARVIEW CLEARIFY SYSTEM

## (undated) DEVICE — MARKING PEN W RULER

## (undated) DEVICE — SUT PDO 0 1/2 CIRCLE 22MM NDL 20CM

## (undated) DEVICE — SPONGE SURGICAL STRIP 1/4 X 6"

## (undated) DEVICE — SYR ASEPTO

## (undated) DEVICE — XI OBTURATOR OPTICAL BLADELESS 8MM

## (undated) DEVICE — MIDAS REX MR8 BALL FLUTED SM BORE 4MM X 10CM

## (undated) DEVICE — SUT VLOC 180 2-0 9" GS-22 GREEN

## (undated) DEVICE — SUT MONOCRYL 4-0 27" PS-2 UNDYED

## (undated) DEVICE — PACK SPINE

## (undated) DEVICE — DRAPE C ARM 41X74"

## (undated) DEVICE — DRSG DERMABOND 0.7ML

## (undated) DEVICE — ELCTR GROUNDING PAD ADULT COVIDIEN

## (undated) DEVICE — ELCTR AQUAMANTYS BIPOLAR SEALER 6.0

## (undated) DEVICE — GLV 7 PROTEXIS (WHITE)

## (undated) DEVICE — XI TIP COVER

## (undated) DEVICE — POSITIONER FOAM EGG CRATE ULNAR 2PCS (PINK)

## (undated) DEVICE — BIPOLAR FORCEP SYMMETRY BAYONET 7" X 1.5MM SMOOTH (SILVER)

## (undated) DEVICE — DRAPE TOP SHEET 53" X 101"

## (undated) DEVICE — TUBING STRYKER PNEUMOCLEAR HIGH FLOW

## (undated) DEVICE — STAPLER SKIN PROXIMATE

## (undated) DEVICE — SPONGE PEANUT AUTO COUNT

## (undated) DEVICE — SUT VICRYL 3-0 18" SH (POP-OFF)

## (undated) DEVICE — DRAPE BACK TABLE COVER 80X90"

## (undated) DEVICE — SUT VICRYL 0 27" UR-6

## (undated) DEVICE — XI DRAPE ARM

## (undated) DEVICE — GLV 7.5 PROTEXIS (WHITE)

## (undated) DEVICE — DRAIN JACKSON PRATT 10MM FLAT 3/4 NO TROCAR

## (undated) DEVICE — SPONGE ENDO PEANUT 5MM

## (undated) DEVICE — DRAPE VARI-LENS2 MICROSCPOPE 68MM

## (undated) DEVICE — ABDOMINAL BINDER MED/LG 9" X 36"-64"

## (undated) DEVICE — SYR LUER LOK 10CC

## (undated) DEVICE — PREP CHLORAPREP HI-LITE ORANGE 26ML

## (undated) DEVICE — DRAPE INSTRUMENT POUCH 6.75" X 11"